# Patient Record
Sex: FEMALE | Race: WHITE | Employment: OTHER | URBAN - METROPOLITAN AREA
[De-identification: names, ages, dates, MRNs, and addresses within clinical notes are randomized per-mention and may not be internally consistent; named-entity substitution may affect disease eponyms.]

---

## 2017-01-11 ENCOUNTER — THERAPY VISIT (OUTPATIENT)
Dept: PHYSICAL THERAPY | Facility: CLINIC | Age: 57
End: 2017-01-11
Payer: COMMERCIAL

## 2017-01-11 DIAGNOSIS — Z47.89 AFTERCARE FOLLOWING SURGERY OF THE MUSCULOSKELETAL SYSTEM: ICD-10-CM

## 2017-01-11 DIAGNOSIS — M75.121 COMPLETE TEAR OF RIGHT ROTATOR CUFF: Primary | ICD-10-CM

## 2017-01-11 PROCEDURE — 97140 MANUAL THERAPY 1/> REGIONS: CPT | Mod: GP | Performed by: PHYSICAL THERAPIST

## 2017-01-11 PROCEDURE — 97110 THERAPEUTIC EXERCISES: CPT | Mod: GP | Performed by: PHYSICAL THERAPIST

## 2017-01-11 PROCEDURE — G0283 ELEC STIM OTHER THAN WOUND: HCPCS | Mod: GP | Performed by: PHYSICAL THERAPIST

## 2017-01-11 NOTE — Clinical Note
[facility] ATHLETIC [facility] PHYSICAL THERAPY  [address]  [facility] [address]  [phone]    2017    Re: Kyung Adair   :   1960  MRN:  [id number]   REFERRING PHYSICIAN:   Mychal Marx    [facility]    Date of Initial Evaluation:  16  Visits:  Rxs Used: 21  Reason for Referral:     Complete tear of right rotator cuff  Aftercare following surgery of the musculoskeletal system    PROGRESS  REPORT    Progress reporting period is from 16 to 17 (21 visits).       SUBJECTIVE  Subjective changes noted by patient:  Patient reports she felt like things were progressing very well overall; however, notes she felt increased pain after her last PT visit.  Pain over alfredito-lateral deltoid/biceps area with reaching since then.  She reports pain level as 1-2/10 at rest and up to 4/10 with reaching up.  Previously, it was intermittent and up to 1-2/10 at most.  Changes during last PT visit included adding 6oz wt with rotator cuff program and attempting wall ball circles elevated above shoulder height.  We discussed backing off exercises for a few days, icing and then gradually resuming.  She will return to see Dr Marx for follow up tomorrow.     Current pain level is:  (1-2/10 at rst 4/10 with reaching).     Previous pain level was: 5/10.   Changes in function:  Yes (See Goal flowsheet attached for changes in current functional level)  Adverse reaction to treatment or activity: None, treatment - increase soreness after last PT visit with gradual increase in resistance     OBJECTIVE  Changes noted in objective findings:   SHOULDER:                Shoulder:   PROM L PROM R AROM L AROM R MMT L MMT R   Flex 175 167 WNL WNL 5 5 with pain anterior shoulder   Abd   WNL Pain , able through full motion (was not painful prior to this week) 5 5   Full Can     5 5   Empty Can     5 5    IR 61 (Abducted 90) 28 (abducted 90) WNL WNL 5 5   ER 88 (Abducted 90) 86 (Abducted 90) WNL WNL 4 4   Ext/IR   T7 T12 with trace pain, not to midline (stiff)       Scapulothoraic Rhythm: nil scap hike with flexion, minimal with abduction    Palpation: tender to palpation anterior shoulder/deltiod and over biceps muscle belly area.     Current PT:   ROM: Pendulum, pulley flexion/scaption, 4 corner flex, ER, abd/ER, ext/IR, cross body and sleeper  Strength: (mostly isotonics) flexion, scaption, IR band, scap ret/dep, wall push up plus, standing ext, standing habd, SL ER 0-6 oz with any hands free exercises and red-green band for IR.   Proprioception: added circles with ball at wall last week about 120 flexion  Low grade post/inf joint mobilization Gr II, gentle PROM in clinic.  Ice as needed                  ASSESSMENT/PLAN  Updated problem list and treatment plan: Diagnosis 1:  S/p R rotator cuff repair, biceps tenotomy, SAD on 8/24/16.  (4.5 months or 20 wk post op)  ( Pain -  hot/cold therapy and electric stimulation  Decreased ROM/flexibility - manual therapy and therapeutic exercise  Decreased strength - therapeutic exercise and therapeutic activities  Decreased proprioception - neuro re-education and therapeutic activities  Decreased function - therapeutic activities  Impaired posture - neuro re-education  STG/LTGs have been met or progress has been made towards goals:  Yes (See Goal flow sheet completed today.)  Assessment of Progress: The patient's condition is improving overall, though she had increased symptoms that were new this past week.   Self Management Plans:  Patient has been instructed in a home treatment program.  I have re-evaluated this patient and find that the nature, scope, duration and intensity of the therapy is appropriate for the medical condition of the patient.  Kyung continues to require the following intervention to meet STG and LTG's:  PT    Recommendations:  This patient would  benefit from continued therapy.     Frequency:  1 X week, once daily  Duration:  for 3 weeks tapering to 1 X every other week over 5 weeks    Thank you for your referral.    INQUIRIES  Therapist: Dexter Martínez   [place] [facility] [place] PH[facility]ICAL [facility]ERAP[facility]  [address] [address]  Phone: [phone]  Fax: [phone]

## 2017-01-11 NOTE — Clinical Note
PT progress note.  She saw you 1/12/17 at [facility].  I faxed a note the evening of 1/11/17, but she said it wasn't available to you at her follow up on 1/12/17. Sorry that you weren't able to see it prior to her visit.   She just requested I pass it along to you at the [facility].  She seems clear on your instructions, and it sounds like we are on the same page, but feel free to let me know if you have any other specific instructions.  Thanks, John Paul

## 2017-01-11 NOTE — PROGRESS NOTES
Subjective:    HPI                    Objective:    System    Physical Exam    General     ROS    Assessment/Plan:      PROGRESS  REPORT    Progress reporting period is from 9/1/16 to 1/11/17 (21 visits).       SUBJECTIVE  Subjective changes noted by patient:  Patient reports she felt like things were progressing very well overall; however, notes she felt increased pain after her last PT visit.  Pain over alfredito-lateral deltoid/biceps area with reaching since then.  She reports pain level as 1-2/10 at rest and up to 4/10 with reaching up.  Previously, it was intermittent and up to 1-2/10 at most.  Changes during last PT visit included adding 6oz wt with rotator cuff program and attempting wall ball circles elevated above shoulder height.  We discussed backing off exercises for a few days, icing and then gradually resuming.  She will return to see Dr Marx for follow up tomorrow.     Current pain level is:  (1-2/10 at rst 4/10 with reaching).     Previous pain level was: 5/10.   Changes in function:  Yes (See Goal flowsheet attached for changes in current functional level)  Adverse reaction to treatment or activity: None, treatment - increase soreness after last PT visit with gradual increase in resistance     OBJECTIVE  Changes noted in objective findings:   SHOULDER:    Shoulder:   PROM L PROM R AROM L AROM R MMT L MMT R   Flex 175 167 WNL WNL 5 5 with pain anterior shoulder   Abd   WNL Pain , able through full motion (was not painful prior to this week) 5 5   Full Can     5 5   Empty Can     5 5   IR 61 (Abducted 90) 28 (abducted 90) WNL WNL 5 5   ER 88 (Abducted 90) 86 (Abducted 90) WNL WNL 4 4   Ext/IR   T7 T12 with trace pain, not to midline (stiff)       Scapulothoraic Rhythm: nil scap hike with flexion, minimal with abduction    Palpation: tender to palpation anterior shoulder/deltiod and over biceps muscle belly area.     Current PT:   ROM: Pendulum, pulley flexion/scaption, 4 corner flex, ER, abd/ER,  ext/IR, cross body and sleeper  Strength: (mostly isotonics) flexion, scaption, IR band, scap ret/dep, wall push up plus, standing ext, standing habd, SL ER 0-6 oz with any hands free exercises and red-green band for IR.   Proprioception: added circles with ball at wall last week about 120 flexion  Low grade post/inf joint mobilization Gr II, gentle PROM in clinic.  Ice as needed      ASSESSMENT/PLAN  Updated problem list and treatment plan: Diagnosis 1:  S/p R rotator cuff repair, biceps tenotomy, SAD on 8/24/16.  (4.5 months or 20 wk post op)  ( Pain -  hot/cold therapy and electric stimulation  Decreased ROM/flexibility - manual therapy and therapeutic exercise  Decreased strength - therapeutic exercise and therapeutic activities  Decreased proprioception - neuro re-education and therapeutic activities  Decreased function - therapeutic activities  Impaired posture - neuro re-education  STG/LTGs have been met or progress has been made towards goals:  Yes (See Goal flow sheet completed today.)  Assessment of Progress: The patient's condition is improving overall, though she had increased symptoms that were new this past week.   Self Management Plans:  Patient has been instructed in a home treatment program.  I have re-evaluated this patient and find that the nature, scope, duration and intensity of the therapy is appropriate for the medical condition of the patient.  Kyung continues to require the following intervention to meet STG and LTG's:  PT    Recommendations:  This patient would benefit from continued therapy.     Frequency:  1 X week, once daily  Duration:  for 3 weeks tapering to 1 X every other week over 5 weeks        Please refer to the daily flowsheet for treatment today, total treatment time and time spent performing 1:1 timed codes.

## 2017-01-18 ENCOUNTER — THERAPY VISIT (OUTPATIENT)
Dept: PHYSICAL THERAPY | Facility: CLINIC | Age: 57
End: 2017-01-18
Payer: COMMERCIAL

## 2017-01-18 DIAGNOSIS — M75.121 COMPLETE TEAR OF RIGHT ROTATOR CUFF: Primary | ICD-10-CM

## 2017-01-18 DIAGNOSIS — Z47.89 AFTERCARE FOLLOWING SURGERY OF THE MUSCULOSKELETAL SYSTEM: ICD-10-CM

## 2017-01-18 PROCEDURE — 97112 NEUROMUSCULAR REEDUCATION: CPT | Mod: GP | Performed by: PHYSICAL THERAPIST

## 2017-01-18 PROCEDURE — 97110 THERAPEUTIC EXERCISES: CPT | Mod: GP | Performed by: PHYSICAL THERAPIST

## 2017-01-18 PROCEDURE — G0283 ELEC STIM OTHER THAN WOUND: HCPCS | Mod: GP | Performed by: PHYSICAL THERAPIST

## 2017-01-25 ENCOUNTER — THERAPY VISIT (OUTPATIENT)
Dept: PHYSICAL THERAPY | Facility: CLINIC | Age: 57
End: 2017-01-25
Payer: COMMERCIAL

## 2017-01-25 DIAGNOSIS — Z47.89 AFTERCARE FOLLOWING SURGERY OF THE MUSCULOSKELETAL SYSTEM: ICD-10-CM

## 2017-01-25 DIAGNOSIS — M75.121 COMPLETE TEAR OF RIGHT ROTATOR CUFF: Primary | ICD-10-CM

## 2017-01-25 PROCEDURE — 97110 THERAPEUTIC EXERCISES: CPT | Mod: GP | Performed by: PHYSICAL THERAPIST

## 2017-01-25 PROCEDURE — G0283 ELEC STIM OTHER THAN WOUND: HCPCS | Mod: GP | Performed by: PHYSICAL THERAPIST

## 2017-01-25 PROCEDURE — 97112 NEUROMUSCULAR REEDUCATION: CPT | Mod: GP | Performed by: PHYSICAL THERAPIST

## 2017-02-01 ENCOUNTER — THERAPY VISIT (OUTPATIENT)
Dept: PHYSICAL THERAPY | Facility: CLINIC | Age: 57
End: 2017-02-01
Payer: COMMERCIAL

## 2017-02-01 DIAGNOSIS — Z47.89 AFTERCARE FOLLOWING SURGERY OF THE MUSCULOSKELETAL SYSTEM: ICD-10-CM

## 2017-02-01 DIAGNOSIS — M75.121 COMPLETE TEAR OF RIGHT ROTATOR CUFF: Primary | ICD-10-CM

## 2017-02-01 PROCEDURE — 97112 NEUROMUSCULAR REEDUCATION: CPT | Mod: GP | Performed by: PHYSICAL THERAPIST

## 2017-02-01 PROCEDURE — G0283 ELEC STIM OTHER THAN WOUND: HCPCS | Mod: GP | Performed by: PHYSICAL THERAPIST

## 2017-02-01 PROCEDURE — 97110 THERAPEUTIC EXERCISES: CPT | Mod: GP | Performed by: PHYSICAL THERAPIST

## 2017-02-15 ENCOUNTER — THERAPY VISIT (OUTPATIENT)
Dept: PHYSICAL THERAPY | Facility: CLINIC | Age: 57
End: 2017-02-15
Payer: COMMERCIAL

## 2017-02-15 DIAGNOSIS — Z47.89 AFTERCARE FOLLOWING SURGERY OF THE MUSCULOSKELETAL SYSTEM: ICD-10-CM

## 2017-02-15 DIAGNOSIS — M75.121 COMPLETE TEAR OF RIGHT ROTATOR CUFF: Primary | ICD-10-CM

## 2017-02-15 PROCEDURE — 97110 THERAPEUTIC EXERCISES: CPT | Mod: GP | Performed by: PHYSICAL THERAPIST

## 2017-02-15 PROCEDURE — G0283 ELEC STIM OTHER THAN WOUND: HCPCS | Mod: GP | Performed by: PHYSICAL THERAPIST

## 2017-02-15 PROCEDURE — 97112 NEUROMUSCULAR REEDUCATION: CPT | Mod: GP | Performed by: PHYSICAL THERAPIST

## 2017-03-08 ENCOUNTER — THERAPY VISIT (OUTPATIENT)
Dept: PHYSICAL THERAPY | Facility: CLINIC | Age: 57
End: 2017-03-08
Payer: COMMERCIAL

## 2017-03-08 DIAGNOSIS — M75.121 COMPLETE TEAR OF RIGHT ROTATOR CUFF: ICD-10-CM

## 2017-03-08 DIAGNOSIS — Z47.89 AFTERCARE FOLLOWING SURGERY OF THE MUSCULOSKELETAL SYSTEM: ICD-10-CM

## 2017-03-08 PROCEDURE — 97110 THERAPEUTIC EXERCISES: CPT | Mod: GP | Performed by: PHYSICAL THERAPIST

## 2017-03-08 PROCEDURE — 97140 MANUAL THERAPY 1/> REGIONS: CPT | Mod: GP | Performed by: PHYSICAL THERAPIST

## 2017-03-08 PROCEDURE — 97112 NEUROMUSCULAR REEDUCATION: CPT | Mod: GP | Performed by: PHYSICAL THERAPIST

## 2017-03-29 ENCOUNTER — THERAPY VISIT (OUTPATIENT)
Dept: PHYSICAL THERAPY | Facility: CLINIC | Age: 57
End: 2017-03-29
Payer: COMMERCIAL

## 2017-03-29 DIAGNOSIS — M75.121 COMPLETE TEAR OF RIGHT ROTATOR CUFF: ICD-10-CM

## 2017-03-29 DIAGNOSIS — Z47.89 AFTERCARE FOLLOWING SURGERY OF THE MUSCULOSKELETAL SYSTEM: ICD-10-CM

## 2017-03-29 PROCEDURE — 97110 THERAPEUTIC EXERCISES: CPT | Mod: GP | Performed by: PHYSICAL THERAPIST

## 2017-03-29 PROCEDURE — 97112 NEUROMUSCULAR REEDUCATION: CPT | Mod: GP | Performed by: PHYSICAL THERAPIST

## 2017-04-21 ENCOUNTER — TELEPHONE (OUTPATIENT)
Dept: OTHER | Facility: CLINIC | Age: 57
End: 2017-04-21

## 2017-04-21 NOTE — TELEPHONE ENCOUNTER
4/21/2017    Call Regarding Onboarding [employer]    Attempt 1    Message on voicemail     Comments: No Dep      Outreach   cnt

## 2017-04-24 ENCOUNTER — THERAPY VISIT (OUTPATIENT)
Dept: PHYSICAL THERAPY | Facility: CLINIC | Age: 57
End: 2017-04-24
Payer: COMMERCIAL

## 2017-04-24 DIAGNOSIS — Z47.89 AFTERCARE FOLLOWING SURGERY OF THE MUSCULOSKELETAL SYSTEM: ICD-10-CM

## 2017-04-24 DIAGNOSIS — M75.121 COMPLETE TEAR OF RIGHT ROTATOR CUFF: ICD-10-CM

## 2017-04-24 PROCEDURE — 97110 THERAPEUTIC EXERCISES: CPT | Mod: GP | Performed by: PHYSICAL THERAPIST

## 2017-04-24 PROCEDURE — 97112 NEUROMUSCULAR REEDUCATION: CPT | Mod: GP | Performed by: PHYSICAL THERAPIST

## 2017-05-04 NOTE — TELEPHONE ENCOUNTER
Call Regarding Onboarding [employer]    Attempt 2    Message on voicemail     Comments:       Outreach   Elli Marie

## 2017-05-10 NOTE — TELEPHONE ENCOUNTER
5/10/2017    Call Regarding Onboarding [employer]    Attempt 3    Message on voicemail     Comments:       Outreach   KV

## 2017-05-23 ENCOUNTER — THERAPY VISIT (OUTPATIENT)
Dept: PHYSICAL THERAPY | Facility: CLINIC | Age: 57
End: 2017-05-23
Payer: COMMERCIAL

## 2017-05-23 DIAGNOSIS — Z47.89 AFTERCARE FOLLOWING SURGERY OF THE MUSCULOSKELETAL SYSTEM: ICD-10-CM

## 2017-05-23 DIAGNOSIS — M75.121 COMPLETE TEAR OF RIGHT ROTATOR CUFF: ICD-10-CM

## 2017-05-23 PROCEDURE — 97112 NEUROMUSCULAR REEDUCATION: CPT | Mod: GP | Performed by: PHYSICAL THERAPIST

## 2017-05-23 PROCEDURE — 97110 THERAPEUTIC EXERCISES: CPT | Mod: GP | Performed by: PHYSICAL THERAPIST

## 2017-05-23 NOTE — LETTER
[facility] ATHLETIC [facility] PHYSICAL THERAPY  [address]  [facility] [address]  [phone]    May 23, 2017    Re: Kyung Adair   :   1960  MRN:  [id number]   REFERRING PHYSICIAN:   Mychal Marx    [facility]    Date of Initial Evaluation:  17  Visits:  Rxs Used: 29  Reason for Referral:     Complete tear of right rotator cuff  Aftercare following surgery of the musculoskeletal system      DISCHARGE REPORT    Progress reporting period is from 16 to 17 (29 visits).       SUBJECTIVE  Subjective changes noted by patient:  Patient reports that she is doing very well overall.  Most of the time she is without pain, rarely, she will have intermittent discomfort up to 1/10 now.  She notes that daily activities such as showering, dressing and doing her hair are giong well.  Work is mostly good with regards to her shoulder.  She has returned to gardening and intermittently has mild soreness with that, but it resolves with rest and ice.  She has been consistent with her HEP all along and understands that well.  She feels ready to be discharged from PT at this point and continue with her HEP independently.  She will follow up with her surgeon, Dr Marx, in August.     Current pain level is:  (0-1/10).     Previous pain level was: 5/10.   Changes in function:  Yes (See Goal flowsheet attached for changes in current functional level)  Adverse reaction to treatment or activity: None    OBJECTIVE  Changes noted in objective findings:  Yes,                 SHOULDER:    Shoulder:   PROM L PROM R AROM L AROM R MMT L MMT R   Flex 175 175 WNL WNL 5 5   Abd   WNL WNL 5 5   Full Can     5 5   Empty Can     5 5   IR 57 (Abducted to 90) 50 (abducted to 90) WNL WNL 5 5   ER 90 (abducted to 90) 90 (abducted to 90) WNL WNL 5 5   Ext/IR   T7 T9 (trace discomfort)       Scapulothoraic Rhythm: elevation is  with good scapular control, without upper trap overactivity or hike during flexion, abduction.     SPADI score: 5.38%    She has rotator cuff strengthening program, scapular strength program and stretching program in place she can refer to.  We discussed a 4 exercise cuff strength maintenance program to try to keep up 1-2x/week just for maintenance/prevention.  She understands this as well .    ASSESSMENT/PLAN  Updated problem list and treatment plan: Diagnosis 1:  s/p R rotator cuff repair, biceps tenotomy, SAD on 8/24/16    Will continue HEP to address any mild/intermittent pain, stiffness and to keep her strength up.   STG/LTGs have been met or progress has been made towards goals:  Yes (See Goal flow sheet completed today.)  Assessment of Progress: The patient's condition is improving.  Self Management Plans:  Patient has been instructed in a home treatment program.  I have re-evaluated this patient and find that the nature, scope, duration and intensity of the therapy is appropriate for the medical condition of the patient.  Kyung continues to require the following intervention to meet STG and LTG's:  PT intervention is no longer required to meet STG/LTG.    Recommendations:  This patient is ready to be discharged from therapy and continue their home treatment program.                  Thank you for your referral.    INQUIRIES  Therapist: Dexter Martínez   [place] [facility] [place] PHYSICAL THERAPY  [address] [address]  Phone: [phone]  Fax: [phone]

## 2017-05-23 NOTE — MR AVS SNAPSHOT
After Visit Summary   5/23/2017    Kyung Adair    MRN: [id number]           Patient Information     Date Of Birth          1960        Visit Information        Provider Department      5/23/2017 4:20 PM Dexter Martínez PT [place] [facility]        Today's Diagnoses     Complete tear of right rotator cuff        Aftercare following surgery of the musculoskeletal system           Follow-ups after your visit        Your next 10 appointments already scheduled     Aug 04, 2017  8:30 AM CDT   (Arrive by 8:15 AM)   Return Visit with Mychal Marx MD   [facility] ([facility])    [address] [place] [address]  [address] [employer] [address] [phone]              Who to contact     If you have questions or need follow up information about today's clinic visit or your schedule please contact [place] [facility]AP[facility] directly at [phone].  Normal or non-critical lab and imaging results will be communicated to you by [employer], letter or phone within 4 business days after the clinic has received the results. If you do not hear from us within 7 days, please contact the clinic through [employer] or phone. If you have a critical or abnormal lab result, we will notify you by phone as soon as possible.  Submit refill requests through [employer] or call your pharmacy and they will forward the refill request to us. Please allow 3 business days for your refill to be completed.          Additional Information About Your Visit        [employer] Information     [employer] gives you secure access to your electronic health record. If you see a primary care provider, you can also send messages to your care team and make appointments. If you have questions, please call your primary care clinic.  If you do not have a primary care provider, please call [phone] and they will assist you.         Care EveryWhere ID     This is your Care EveryWhere ID. This could be used by other organizations to access your [facility] medical records  [id number]         Blood Pressure from Last 3 Encounters:   08/25/16 130/66   08/10/16 (!) 158/97   08/14/13 (!) 147/100    Weight from Last 3 Encounters:   08/24/16 132.5 kg (292 lb 1.8 oz)   08/10/16 133.8 kg (295 lb)   07/01/16 131.5 kg (290 lb)              We Performed the Following     HORTENCIA PROGRESS NOTES REPORT     NEUROMUSCULAR RE-EDUCATION     THERAPEUTIC EXERCISES        Primary Care Provider Office Phone # Fax #    [facility] [phone]       [facility] [address] [address]        Thank you!     Thank you for [facility] PHYSICAL THERAPY  for your care. Our goal is always to provide you with excellent care. Hearing back from our patients is one way we can continue to improve our services. Please take a few minutes to complete the written survey that you may receive in the mail after your visit with us. Thank you!             Your Updated Medication List - Protect others around you: Learn how to safely use, store and throw away your medicines at www.disposemymeds.org.          This list is accurate as of: 5/23/17  7:33 PM.  Always use your most recent med list.                   Brand Name Dispense Instructions for use    albuterol 90 MCG/ACT inhaler      Inhale 2 puffs into the lungs every 6 hours as needed.       CALCIUM 500 + D PO      Take 1 tablet by mouth 2 times daily.       COUMADIN 5 MG tablet   Generic drug:  warfarin     30 tablet    Take 1.5 tablets (7.5 mg) by mouth daily       enoxaparin 40 MG/0.4ML injection    LOVENOX    4 mL    Inject 0.4 mLs (40 mg) Subcutaneous every 24 hours       GLUCOPHAGE 850 MG tablet   Generic drug:  metFORMIN      Take 850 mg by mouth 2 times daily (with meals).       heparin sodium PF 5000 UNIT/0.5ML injection     2 mL     Inject 0.5 mLs (5,000 Units) Subcutaneous every 8 hours       oxyCODONE 5 MG IR tablet    ROXICODONE    80 tablet    Take 1-2 tablets (5-10 mg) by mouth every 4 hours as needed for moderate to severe pain       QVAR 80 MCG/ACT Inhaler   Generic drug:  beclomethasone      Inhale 1 puff into the lungs as needed       senna-docusate 8.6-50 MG per tablet    SENOKOT-S;PERICOLACE    30 tablet    Take 1-2 tablets by mouth 2 times daily       SYNTHROID 137 MCG tablet   Generic drug:  levothyroxine      Take 137 mcg by mouth daily       valACYclovir 500 MG tablet    VALTREX

## 2017-05-24 NOTE — PROGRESS NOTES
Subjective:    HPI                    Objective:    System    Physical Exam    General     ROS    Assessment/Plan:      DISCHARGE REPORT    Progress reporting period is from 9/1/16 to 5/23/17 (29 visits).       SUBJECTIVE  Subjective changes noted by patient:  Patient reports that she is doing very well overall.  Most of the time she is without pain, rarely, she will have intermittent discomfort up to 1/10 now.  She notes that daily activities such as showering, dressing and doing her hair are giong well.  Work is mostly good with regards to her shoulder.  She has returned to gardening and intermittently has mild soreness with that, but it resolves with rest and ice.  She has been consistent with her HEP all along and understands that well.  She feels ready to be discharged from PT at this point and continue with her HEP independently.  She will follow up with her surgeon, Dr Marx, in August.     Current pain level is:  (0-1/10).     Previous pain level was: 5/10.   Changes in function:  Yes (See Goal flowsheet attached for changes in current functional level)  Adverse reaction to treatment or activity: None    OBJECTIVE  Changes noted in objective findings:  Yes,   SHOULDER:    Shoulder:   PROM L PROM R AROM L AROM R MMT L MMT R   Flex 175 175 WNL WNL 5 5   Abd   WNL WNL 5 5   Full Can     5 5   Empty Can     5 5   IR 57 (Abducted to 90) 50 (abducted to 90) WNL WNL 5 5   ER 90 (abducted to 90) 90 (abducted to 90) WNL WNL 5 5   Ext/IR   T7 T9 (trace discomfort)       Scapulothoraic Rhythm: elevation is with good scapular control, without upper trap overactivity or hike during flexion, abduction.     SPADI score: 5.38%    She has rotator cuff strengthening program, scapular strength program and stretching program in place she can refer to.  We discussed a 4 exercise cuff strength maintenance program to try to keep up 1-2x/week just for maintenance/prevention.  She understands this as well  .    ASSESSMENT/PLAN  Updated problem list and treatment plan: Diagnosis 1:  s/p R rotator cuff repair, biceps tenotomy, SAD on 8/24/16    Will continue HEP to address any mild/intermittent pain, stiffness and to keep her strength up.   STG/LTGs have been met or progress has been made towards goals:  Yes (See Goal flow sheet completed today.)  Assessment of Progress: The patient's condition is improving.  Self Management Plans:  Patient has been instructed in a home treatment program.  I have re-evaluated this patient and find that the nature, scope, duration and intensity of the therapy is appropriate for the medical condition of the patient.  Kyung continues to require the following intervention to meet STG and LTG's:  PT intervention is no longer required to meet STG/LTG.    Recommendations:  This patient is ready to be discharged from therapy and continue their home treatment program.    Please refer to the daily flowsheet for treatment today, total treatment time and time spent performing 1:1 timed codes.

## 2017-06-03 ENCOUNTER — HEALTH MAINTENANCE LETTER (OUTPATIENT)
Age: 57
End: 2017-06-03

## 2017-08-04 ENCOUNTER — OFFICE VISIT (OUTPATIENT)
Dept: ORTHOPEDICS | Facility: CLINIC | Age: 57
End: 2017-08-04

## 2017-08-04 DIAGNOSIS — Z98.890 S/P SHOULDER SURGERY: Primary | ICD-10-CM

## 2017-08-04 NOTE — MR AVS SNAPSHOT
After Visit Summary   8/4/2017    Kyung Adair    MRN: [id number]           Patient Information     Date Of Birth          1960        Visit Information        Provider Department      8/4/2017 8:30 AM Mychal Marx MD [facility]        Today's Diagnoses     S/P shoulder surgery    -  1       Follow-ups after your visit        Who to contact     Please call your clinic at [phone] to:    Ask questions about your health    Make or cancel appointments    Discuss your medicines    Learn about your test results    Speak to your doctor   If you have compliments or concerns about an experience at your clinic, or if you wish to file a complaint, please contact [facility] Patient Relations at [phone] or email us at [email or web address]         Additional Information About Your Visit        MyChart Information     [employer] gives you secure access to your electronic health record. If you see a primary care provider, you can also send messages to your care team and make appointments. If you have questions, please call your primary care clinic.  If you do not have a primary care provider, please call [phone] and they will assist you.      "[employer] is an electronic gateway that provides easy, online access to your medical records. With "[employer] you can request a clinic appointment, read your test results, renew a prescription or communicate with your care team.     To access your existing account, please contact your [facility] or call [phone] for assistance.        Care EveryWhere ID     This is your Care EveryWhere ID. This could be used by other organizations to access your [place] medical records  [id number]         Blood Pressure from Last 3 Encounters:   08/25/16 130/66   08/10/16 (!) 158/97   08/14/13 (!) 147/100    Weight from Last 3 Encounters:   08/24/16 292 lb 1.8 oz (132.5 kg)    08/10/16 295 lb (133.8 kg)   07/01/16 290 lb (131.5 kg)              Today, you had the following     No orders found for display       Primary Care Provider Office Phone # Fax #    [facility] [phone]       [facility] [address] [address]        Equal Access to Services     [facility]: Hadii aad ku hadasho Soomaali, waaxda luqadaha, qaybta kaalmada adeegyada, waxay idiin hayaan adeeg kharash lanolberton . So [facility] [phone].    ATENCIÓN: Si habla espiban, tiene a sotomayor disposición servicios gratuitos de asistencia lingüística. Llame al [phone].    We comply with applicable federal civil rights laws and [place] laws. We do not discriminate on the basis of race, color, national origin, age, disability sex, sexual orientation or gender identity.            Thank you!     Thank you for choosing [facility]  for your care. Our goal is always to provide you with excellent care. Hearing back from our patients is one way we can continue to improve our services. Please take a few minutes to complete the written survey that you may receive in the mail after your visit with us. Thank you!             Your Updated Medication List - Protect others around you: Learn how to safely use, store and throw away your medicines at www.disposemymeds.org.          This list is accurate as of: 8/4/17 11:59 PM.  Always use your most recent med list.                   Brand Name Dispense Instructions for use Diagnosis    albuterol 90 MCG/ACT inhaler      Inhale 2 puffs into the lungs every 6 hours as needed.        CALCIUM 500 + D PO      Take 1 tablet by mouth 2 times daily.        COUMADIN 5 MG tablet   Generic drug:  warfarin     30 tablet    Take 1.5 tablets (7.5 mg) by mouth daily    S/P rotator cuff repair       enoxaparin 40 MG/0.4ML injection    LOVENOX    4 mL    Inject 0.4 mLs (40 mg) Subcutaneous every 24 hours    Chronic anticoagulation        GLUCOPHAGE 850 MG tablet   Generic drug:  metFORMIN      Take 850 mg by mouth 2 times daily (with meals).        heparin sodium PF 5000 UNIT/0.5ML injection     2 mL    Inject 0.5 mLs (5,000 Units) Subcutaneous every 8 hours    S/P rotator cuff repair       oxyCODONE 5 MG IR tablet    ROXICODONE    80 tablet    Take 1-2 tablets (5-10 mg) by mouth every 4 hours as needed for moderate to severe pain    S/P rotator cuff repair       QVAR 80 MCG/ACT Inhaler   Generic drug:  beclomethasone      Inhale 1 puff into the lungs as needed        senna-docusate 8.6-50 MG per tablet    SENOKOT-S;PERICOLACE    30 tablet    Take 1-2 tablets by mouth 2 times daily    S/P rotator cuff repair       SYNTHROID 137 MCG tablet   Generic drug:  levothyroxine      Take 137 mcg by mouth daily        valACYclovir 500 MG tablet    VALTREX

## 2017-08-04 NOTE — LETTER
Return to Work  2017     Seen today: yes    Patient:  Kyung Adair  :   1960  MRN:     [id number]  Physician: PETRA MARX    Kyung Adair may return to work with the following restrictions regarding bilateral shoulders:    -No lifting over 10lbs  -No repetitive overhead lifting    She will follow up with me as needed.         Electronically signed by Petra Marx MD & Courtney Kennedy ATC

## 2017-08-04 NOTE — LETTER
2017      RE: Dona August  [address]  [facility] [address]       CHIEF COMPLAINT:  Postoperative visit, bilateral shoulders.      HISTORY OF PRESENT ILLNESS:  Ms. August is a 56-year-old female who is now 6 years status post left arthroscopic rotator cuff repair and 1 year status post right arthroscopic rotator cuff repair.  She is doing well.  She would rank her left shoulder as a 100 and her right shoulder as a 95.  She has no pain.  She has no functional limitations.  She is very happy with her surgical results at this time.      PHYSICAL EXAMINATION:  56-year-old female, alert and oriented, in no apparent distress.  Range of motion reveals 160 degrees of forward flexion, 45 degrees of external rotation, 90 degrees of external rotation in abduction, 40 degrees of internal rotation on the left and 20 on the right.  Her rotator cuff strength is 5/5.  She has no AC joint tenderness bilaterally.  No biceps deformity bilaterally.      IMPRESSION:  56-year-old female 6 years status post left arthroscopic rotator cuff repair and biceps tenotomy and 1 year status post right arthroscopic rotator cuff repair and biceps tenotomy, doing extremely well.  Dona and I had a long conversation today about caring for her shoulders.  I think it would be a good idea for her to have a permanent work restriction that limited her lifting to 10 pounds.  In addition, she should avoid repetitive overhead work.      PLAN:   1.  She was given a letter for work restrictions.   2.  She will follow up with me in 1 year for a routine recheck.        I spent 15 minutes with this patient, 10 minutes dedicated to counseling, education and development of a treatment plan.         PETRA COOK MD             D: 2017 18:46   T: 2017 20:50   MT: LITA      Name:     DONA AUGUST   MRN:      [id number]        Account:      [id number]   :      1960           Service Date: 2017      Document:  [id number]

## 2017-08-07 NOTE — PROGRESS NOTES
CHIEF COMPLAINT:  Postoperative visit, bilateral shoulders.      HISTORY OF PRESENT ILLNESS:  Ms. August is a 56-year-old female who is now 6 years status post left arthroscopic rotator cuff repair and 1 year status post right arthroscopic rotator cuff repair.  She is doing well.  She would rank her left shoulder as a 100 and her right shoulder as a 95.  She has no pain.  She has no functional limitations.  She is very happy with her surgical results at this time.      PHYSICAL EXAMINATION:  56-year-old female, alert and oriented, in no apparent distress.  Range of motion reveals 160 degrees of forward flexion, 45 degrees of external rotation, 90 degrees of external rotation in abduction, 40 degrees of internal rotation on the left and 20 on the right.  Her rotator cuff strength is 5/5.  She has no AC joint tenderness bilaterally.  No biceps deformity bilaterally.      IMPRESSION:  56-year-old female 6 years status post left arthroscopic rotator cuff repair and biceps tenotomy and 1 year status post right arthroscopic rotator cuff repair and biceps tenotomy, doing extremely well.  Dona and I had a long conversation today about caring for her shoulders.  I think it would be a good idea for her to have a permanent work restriction that limited her lifting to 10 pounds.  In addition, she should avoid repetitive overhead work.      PLAN:   1.  She was given a letter for work restrictions.   2.  She will follow up with me in 1 year for a routine recheck.        I spent 15 minutes with this patient, 10 minutes dedicated to counseling, education and development of a treatment plan.         PETRA COOK MD             D: 2017 18:46   T: 2017 20:50   MT: LITA      Name:     DONA AUGUST   MRN:      [id number]        Account:      [id number]   :      1960           Service Date: 2017      Document: [id number]

## 2018-06-26 ENCOUNTER — THERAPY VISIT (OUTPATIENT)
Dept: PHYSICAL THERAPY | Facility: CLINIC | Age: 58
End: 2018-06-26
Payer: COMMERCIAL

## 2018-06-26 DIAGNOSIS — M17.0 PRIMARY OSTEOARTHRITIS OF BOTH KNEES: Primary | ICD-10-CM

## 2018-06-26 PROCEDURE — 97161 PT EVAL LOW COMPLEX 20 MIN: CPT | Mod: GP | Performed by: PHYSICAL THERAPIST

## 2018-06-26 PROCEDURE — 97110 THERAPEUTIC EXERCISES: CPT | Mod: GP | Performed by: PHYSICAL THERAPIST

## 2018-06-26 ASSESSMENT — ACTIVITIES OF DAILY LIVING (ADL)
SQUAT: I AM UNABLE TO DO THE ACTIVITY
STAND: ACTIVITY IS VERY DIFFICULT
KNEEL ON THE FRONT OF YOUR KNEE: I AM UNABLE TO DO THE ACTIVITY
PAIN: THE SYMPTOM AFFECTS MY ACTIVITY SEVERELY
LIMPING: THE SYMPTOM AFFECTS MY ACTIVITY SEVERELY
KNEE_ACTIVITY_OF_DAILY_LIVING_SUM: 17
SWELLING: THE SYMPTOM AFFECTS MY ACTIVITY SLIGHTLY
GO UP STAIRS: ACTIVITY IS VERY DIFFICULT
RAW_SCORE: 17
HOW_WOULD_YOU_RATE_THE_CURRENT_FUNCTION_OF_YOUR_KNEE_DURING_YOUR_USUAL_DAILY_ACTIVITIES_ON_A_SCALE_FROM_0_TO_100_WITH_100_BEING_YOUR_LEVEL_OF_KNEE_FUNCTION_PRIOR_TO_YOUR_INJURY_AND_0_BEING_THE_INABILITY_TO_PERFORM_ANY_OF_YOUR_USUAL_DAILY_ACTIVITIES?: 50
WEAKNESS: THE SYMPTOM AFFECTS MY ACTIVITY SEVERELY
SIT WITH YOUR KNEE BENT: ACTIVITY IS VERY DIFFICULT
STIFFNESS: THE SYMPTOM AFFECTS MY ACTIVITY MODERATELY
AS_A_RESULT_OF_YOUR_KNEE_INJURY,_HOW_WOULD_YOU_RATE_YOUR_CURRENT_LEVEL_OF_DAILY_ACTIVITY?: ABNORMAL
KNEE_ACTIVITY_OF_DAILY_LIVING_SCORE: 24.29
HOW_WOULD_YOU_RATE_THE_OVERALL_FUNCTION_OF_YOUR_KNEE_DURING_YOUR_USUAL_DAILY_ACTIVITIES?: ABNORMAL
GO DOWN STAIRS: ACTIVITY IS VERY DIFFICULT
RISE FROM A CHAIR: ACTIVITY IS VERY DIFFICULT
WALK: ACTIVITY IS VERY DIFFICULT
GIVING WAY, BUCKLING OR SHIFTING OF KNEE: THE SYMPTOM AFFECTS MY ACTIVITY SLIGHTLY

## 2018-06-26 NOTE — PROGRESS NOTES
[facility] Initial Evaluation  Subjective:  HPI Comments: MD orders include bilateral knees. Patient stating she is seeking treatment for her left knee today.    Patient is a 57 year old female presenting with rehab left knee hpi.   Kyung Adair is a 57 year old female with a bilateral knees (L>R) condition.  Condition occurred with:  Insidious onset.    This is a chronic and recurrent condition  Patient saw MD on 6/4/18 for L knee pain that began ~4 months ago. Had been seeing chiroprator for 6-8 months prior .    Patient reports pain:  Anterior and in the joint.    Pain is described as stabbing, shooting, sharp, cramping, burning and aching and is intermittent and reported as 6/10 and 10/10 (at its worst).  Associated symptoms:  Buckling/giving out, loss of motion/stiffness and loss of strength. Pain is the same all the time (activity dependent).  Symptoms are exacerbated by descending stairs, ascending stairs, bending/squatting, kneeling, standing, walking and transfers (sitting with knee bent) and relieved by NSAID's.  Since onset symptoms are gradually worsening.  Special tests:  X-ray.  Previous treatment includes physical therapy (2014 for knees).  There was significant improvement following previous treatment.  General health as reported by patient is good.  Pertinent medical history includes:  Asthma, cancer, overweight, thyroid problems, menopausal and sleep disorder/apnea.  Medical allergies: yes.  Other surgeries include:  Orthopedic surgery and cancer surgery (bilateral RCR, most recent 8/2016, s/p hysterectomy).  Current medications:  Heparin/coumadin (Metformin).  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  Primary job tasks include:  Repetitive tasks and prolonged sitting (computer work).    Barriers: Basement stairs for laundry.    Red flags:  None as reported by the patient.                         Objective:  Standing Alignment:              Knee:  Genu recuvatum L, genu recurvatum R, genu valgus L and genu valgus R      Gait:    Gait Type:  Antalgic   Assistive Devices:  None  Deviations:  Ankle:  Heel strike decr L and push off decr LGeneral Deviations:  Stance time decr, stride length decr, chanelle decr, base of support incr and toe out L  Non-Weight Bearing:        Knee:  Patellar lateral tilt L and patellar lateral tilt R    Flexibility/Screens:       Lower Extremity:  Decreased left lower extremity flexibility:Gastroc    Decreased right lower extremity flexibility:  Gastroc                                                 Hip Evaluation    Hip Strength:    Flexion:   Left: 4-/5   +/-  Pain:  Right: 4/5   -  Pain:                      Abduction:  Left: 3+/5    +/-   Pain:Right: 4-/5   -   Pain:                           Knee Evaluation:  ROM:    AROM    Hyperextension:  Left:  5    Right: 5  Extension:  Left: 0    Right:  0  Flexion: Left: 110    Right: 125    Pain: limiting left knee flexion    Strength:     Extension:  Left: 3+/5    Pain:+      Right: 4+/5    Pain:-  Flexion:  Left: 3/5    Pain:+      Right: 4+/5    Pain:-    Quad Set Left:  Poor    Pain: +   Quad Set Right:  Good    Pain: -      Palpation:    Left knee tenderness present at:  Medial Joint Line; Lateral Joint Line; Patellar Medial and Patellar Lateral    Edema:  Normal    Mobility Testing:        Patellofemoral Lateral:  Left: hypomobile          Functional Testing:            Proprioception:   Stork Balance Test:  Left:  Unable  Right:  Unable  % of Uninvolved:           General     ROS    Assessment/Plan:    Patient is a 57 year old female with L>R knee complaints.    Patient has the following significant findings with corresponding treatment plan.                Diagnosis 1:  Knee OA/PF pain  Pain -  hot/cold therapy, manual therapy, self management, education, directional preference exercise and home program  Decreased  ROM/flexibility - manual therapy, therapeutic exercise, therapeutic activity and home program  Decreased joint mobility - manual therapy, therapeutic exercise, therapeutic activity and home program  Decreased strength - therapeutic exercise, therapeutic activities and home program  Decreased proprioception - neuro re-education, gait training, therapeutic activities and home program  Impaired gait - gait training, assistive devices and home program  Decreased function - therapeutic activities and home program    Therapy Evaluation Codes:   1) History comprised of:   Personal factors that impact the plan of care:      Living environment and Profession.    Comorbidity factors that impact the plan of care are:      None.     Medications impacting care: None.  2) Examination of Body Systems comprised of:   Body structures and functions that impact the plan of care:      Knee.   Activity limitations that impact the plan of care are:      Sitting, Stairs, Standing, Walking and Working.  3) Clinical presentation characteristics are:   Stable/Uncomplicated.  4) Decision-Making    Low complexity using standardized patient assessment instrument and/or measureable assessment of functional outcome.  Cumulative Therapy Evaluation is: Low complexity.    Previous and current functional limitations:  (See Goal Flow Sheet for this information)    Short term and Long term goals: (See Goal Flow Sheet for this information)     Communication ability:  Patient appears to be able to clearly communicate and understand verbal and written communication and follow directions correctly.  Treatment Explanation - The following has been discussed with the patient:   RX ordered/plan of care  Anticipated outcomes  Possible risks and side effects  This patient would benefit from PT intervention to resume normal activities.   Rehab potential is good.    Frequency:  2 X week, once daily  Duration:  for 4 weeks  Discharge Plan:  Achieve all  LTG.  Independent in home treatment program.  Reach maximal therapeutic benefit.    Please refer to the daily flowsheet for treatment today, total treatment time and time spent performing 1:1 timed codes.

## 2018-06-26 NOTE — LETTER
[facility]  [address]  [place] [address]  [phone]    2018    Re: Kyung Adair   :   1960  MRN:  [id number]   REFERRING PHYSICIAN:   Jasmina Fortune    [facility]    Date of Initial Evaluation:  2018  Visits:  Rxs Used: 1  Reason for Referral:  Primary osteoarthritis of both knees    EVALUATION SUMMARY    [facility]  Subjective:  HPI Comments: MD orders include bilateral knees. Patient stating she is seeking treatment for her left knee today.    Patient is a 57 year old female presenting with rehab left knee hpi.   Kyung Adair is a 57 year old female with a bilateral knees (L>R) condition.  Condition occurred with: Insidious onset.  This is a chronic and recurrent condition.  Patient saw MD on 18 for L knee pain that began ~4 months ago. Had been seeing chiroprator for 6-8 months prior.    Patient reports pain: Anterior and in the joint.  Pain is described as stabbing, shooting, sharp, cramping, burning and aching and is intermittent and reported as 6/10 and 10/10 (at its worst).  Associated symptoms: Buckling/giving out, loss of motion/stiffness and loss of strength.  Pain is the same all the time (activity dependent).  Symptoms are exacerbated by descending stairs, ascending stairs, bending/squatting, kneeling, standing, walking and transfers (sitting with knee bent) and relieved by NSAID's.  Since onset symptoms are gradually worsening.  Special tests:  X-ray.  Previous treatment includes physical therapy ( for knees).  There was significant improvement following previous treatment.  General health as reported by patient is good.  Pertinent medical history includes: Asthma, cancer, overweight, thyroid problems, menopausal and sleep disorder/apnea.  Medical allergies: yes.  Other surgeries  include: Orthopedic surgery and cancer surgery (bilateral RCR, most recent 2016, s/p hysterectomy).  Current medications: Heparin/coumadin (Metformin).  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  Primary job tasks include: Repetitive tasks and prolonged sitting (computer work).  Barriers: Basement stairs for laundry.  Red flags:  None as reported by the patient.    Objective:  Standing Alignment:    Knee:  Genu recuvatum L, genu recurvatum R, genu valgus L and genu valgus R  Gait:    Gait Type:  Antalgic   Assistive Devices:  None    Re: Kyung Adair   :   1960    Deviations:  Ankle:  Heel strike decr L and push off decr LGeneral Deviations:  Stance time decr, stride length decr, chanelle decr, base of support incr and toe out L    Non-Weight Bearing:    Knee:  Patellar lateral tilt L and patellar lateral tilt R  Flexibility/Screens:   Lower Extremity:  Decreased left lower extremity flexibility: Gastroc  Decreased right lower extremity flexibility: Gastroc    Hip Evaluation  Hip Strength:    Flexion:   Left: 4-/5   +/-  Pain:  Right: 4/5   -  Pain:  Abduction:  Left: 3+/5    +/-   Pain:Right: 4-/5   -   Pain:    Knee Evaluation:  ROM:    AROM  Hyperextension:  Left:  5    Right: 5  Extension:  Left: 0    Right:  0  Flexion: Left: 110    Right: 125  Pain: limiting left knee flexion    Strength:   Extension:  Left: 3+/5    Pain:+      Right: 4+/5    Pain:-  Flexion:  Left: 3/5    Pain:+      Right: 4+/5    Pain:-    Quad Set Left:  Poor    Pain: +   Quad Set Right:  Good    Pain: -    Palpation:    Left knee tenderness present at:  Medial Joint Line; Lateral Joint Line; Patellar Medial and Patellar Lateral    Edema:  Normal    Mobility Testing:    Patellofemoral Lateral:  Left: hypomobile        Functional Testing:    Proprioception:   Stork Balance Test:  Left:  Unable  Right:  Unable  % of Uninvolved:     Assessment/Plan:    Patient is a  57 year old female with L>R knee complaints.    Patient has the following significant findings with corresponding treatment plan.                Diagnosis 1:  Knee OA/PF pain  Pain -  hot/cold therapy, manual therapy, self management, education, directional preference exercise and home program  Decreased ROM/flexibility - manual therapy, therapeutic exercise, therapeutic activity and home program  Decreased joint mobility - manual therapy, therapeutic exercise, therapeutic activity and home program  Re: Kyung Adair   :   1960    Decreased strength - therapeutic exercise, therapeutic activities and home program  Decreased proprioception - neuro re-education, gait training, therapeutic activities and home program  Impaired gait - gait training, assistive devices and home program  Decreased function - therapeutic activities and home program    Therapy Evaluation Codes:   1) History comprised of:   Personal factors that impact the plan of care:      Living environment and Profession.    Comorbidity factors that impact the plan of care are:  None.     Medications impacting care: None.  2) Examination of Body Systems comprised of:   Body structures and functions that impact the plan of care:      Knee.   Activity limitations that impact the plan of care are:      Sitting, Stairs, Standing, Walking and Working.  3) Clinical presentation characteristics are:   Stable/Uncomplicated.  4) Decision-Making    Low complexity using standardized patient assessment instrument and/or   measureable assessment of functional outcome.  Cumulative Therapy Evaluation is: Low complexity.    Previous and current functional limitations:  (See Goal Flow Sheet for this information)    Short term and Long term goals: (See Goal Flow Sheet for this information)     Communication ability:  Patient appears to be able to clearly communicate and understand verbal and written communication and follow directions correctly.  Treatment  Explanation - The following has been discussed with the patient:   RX ordered/plan of care  Anticipated outcomes  Possible risks and side effects  This patient would benefit from PT intervention to resume normal activities.   Rehab potential is good.    Frequency:  2 X week, once daily  Duration:  for 4 weeks  Discharge Plan:  Achieve all LTG.  Independent in home treatment program.  Reach maximal therapeutic benefit.    Thank you for your referral.    INQUIRIES  Therapist: Carol Gibson PT  [facility]  [address] [address]  Phone: [phone]  Fax: [phone]

## 2018-06-26 NOTE — MR AVS SNAPSHOT
After Visit Summary   6/26/2018    Kyung Adair    MRN: [id number]           Patient Information     Date Of Birth          1960        Visit Information        Provider Department      6/26/2018 4:10 PM Carol Noyola, PT [facility] Physical Therapy        Today's Diagnoses     Primary osteoarthritis of both knees    -  1       Follow-ups after your visit        Your next 10 appointments already scheduled     Jun 29, 2018 12:50 PM CDT   HORTENCIA Extremity with Ghazala Rea PTA   [facility] Physical Therapy ([facility])    [address] [place] [address] [address] [place] [address]   [phone]            Jul 02, 2018  4:10 PM CDT   HORTENCIA Extremity with Ghazala Rea PTA   [facility] Physical Therapy ([facility])    [address] [place] [address] [address] [place] [address]   [phone]            Jul 11, 2018  4:10 PM CDT   HORTENCIA Extremity with Ghazala Rea PTA   [facility] Physical Therapy ([facility])    [address] [place] [address] [address] [place] [address]   [phone]            Jul 18, 2018  4:10 PM CDT   HORTENCIA Extremity with Florence Morales, PT   [facility] Physical Therapy ([facility])    [address] [place] [address] [address] [place] [address]   [phone]            Jul 25, 2018  4:10 PM CDT   HORTENCIA Extremity with Ghazala Rea PTA   [facility] Physical Therapy ([facility])    [address] [place] [address] [address] [place] [address]   [phone]              Who to contact     If you have questions or need follow up information about today's clinic visit or your schedule please contact [facility] PHYSICAL THERAPY directly at  [phone].  Normal or non-critical lab and imaging results will be communicated to you by MyChart, letter or phone within 4 business days after the clinic has received the results. If you do not hear from us within 7 days, please contact the clinic through Sapiens[employer] or phone. If you have a critical or abnormal lab result, we will notify you by phone as soon as possible.  Submit refill requests through [employer] or call your pharmacy and they will forward the refill request to us. Please allow 3 business days for your refill to be completed.          Additional Information About Your Visit        Sapienshart Information     [employer] gives you secure access to your electronic health record. If you see a primary care provider, you can also send messages to your care team and make appointments. If you have questions, please call your primary care clinic.  If you do not have a primary care provider, please call [phone] and they will assist you.        Care EveryWhere ID     This is your Care EveryWhere ID. This could be used by other organizations to access your [facility] medical records  [id number]         Blood Pressure from Last 3 Encounters:   08/25/16 130/66   08/10/16 (!) 158/97   08/14/13 (!) 147/100    Weight from Last 3 Encounters:   08/24/16 132.5 kg (292 lb 1.8 oz)   08/10/16 133.8 kg (295 lb)   07/01/16 131.5 kg (290 lb)              We Performed the Following     HC PT EVAL, LOW COMPLEXITY     HORTENCIA INITIAL EVAL REPORT     THERAPEUTIC EXERCISES        Primary Care Provider Office Phone # Fax #    Bell BUCKNER Srikanth [phone]       [facility] [address] [facility] [address]        Equal Access to Services     JAYDEN [facility] : Hadii harpal Villarreal, waaxda luqadaha, qaybta kaalmada medhat, kaye howell. So [facility] [phone].    ATENCIÓN: Si habla español, tiene a sotomayor disposición servicios gratuitos de asistencia lingüística. Llame al  [phone].    We comply with applicable federal civil rights laws and [place] laws. We do not discriminate on the basis of race, color, national origin, age, disability, sex, sexual orientation, or gender identity.            Thank you!     Thank you for choosing [place] [facility] PHYSICAL THERAPY  for your care. Our goal is always to provide you with excellent care. Hearing back from our patients is one way we can continue to improve our services. Please take a few minutes to complete the written survey that you may receive in the mail after your visit with us. Thank you!             Your Updated Medication List - Protect others around you: Learn how to safely use, store and throw away your medicines at www.disposemymeds.org.          This list is accurate as of 6/26/18 11:18 PM.  Always use your most recent med list.                   Brand Name Dispense Instructions for use Diagnosis    albuterol 90 MCG/ACT inhaler      Inhale 2 puffs into the lungs every 6 hours as needed.        CALCIUM 500 + D PO      Take 1 tablet by mouth 2 times daily.        COUMADIN 5 MG tablet   Generic drug:  warfarin     30 tablet    Take 1.5 tablets (7.5 mg) by mouth daily    S/P rotator cuff repair       enoxaparin 40 MG/0.4ML injection    LOVENOX    4 mL    Inject 0.4 mLs (40 mg) Subcutaneous every 24 hours    Chronic anticoagulation       GLUCOPHAGE 850 MG tablet   Generic drug:  metFORMIN      Take 850 mg by mouth 2 times daily (with meals).        heparin sodium PF 5000 UNIT/0.5ML injection     2 mL    Inject 0.5 mLs (5,000 Units) Subcutaneous every 8 hours    S/P rotator cuff repair       oxyCODONE IR 5 MG tablet    ROXICODONE    80 tablet    Take 1-2 tablets (5-10 mg) by mouth every 4 hours as needed for moderate to severe pain    S/P rotator cuff repair       QVAR 80 MCG/ACT Inhaler   Generic drug:  beclomethasone      Inhale 1 puff into the lungs as needed        senna-docusate 8.6-50 MG per  tablet    SENOKOT-S;PERICOLACE    30 tablet    Take 1-2 tablets by mouth 2 times daily    S/P rotator cuff repair       SYNTHROID 137 MCG tablet   Generic drug:  levothyroxine      Take 137 mcg by mouth daily        valACYclovir 500 MG tablet    VALTREX

## 2018-07-02 ENCOUNTER — THERAPY VISIT (OUTPATIENT)
Dept: PHYSICAL THERAPY | Facility: CLINIC | Age: 58
End: 2018-07-02
Payer: COMMERCIAL

## 2018-07-02 DIAGNOSIS — M17.0 PRIMARY OSTEOARTHRITIS OF BOTH KNEES: ICD-10-CM

## 2018-07-02 PROCEDURE — 97112 NEUROMUSCULAR REEDUCATION: CPT | Mod: GP | Performed by: PHYSICAL THERAPY ASSISTANT

## 2018-07-02 PROCEDURE — 97110 THERAPEUTIC EXERCISES: CPT | Mod: GP | Performed by: PHYSICAL THERAPY ASSISTANT

## 2018-07-11 ENCOUNTER — THERAPY VISIT (OUTPATIENT)
Dept: PHYSICAL THERAPY | Facility: CLINIC | Age: 58
End: 2018-07-11
Payer: COMMERCIAL

## 2018-07-11 DIAGNOSIS — M17.0 PRIMARY OSTEOARTHRITIS OF BOTH KNEES: ICD-10-CM

## 2018-07-11 PROCEDURE — 97110 THERAPEUTIC EXERCISES: CPT | Mod: GP | Performed by: PHYSICAL THERAPY ASSISTANT

## 2018-07-25 ENCOUNTER — THERAPY VISIT (OUTPATIENT)
Dept: PHYSICAL THERAPY | Facility: CLINIC | Age: 58
End: 2018-07-25
Payer: COMMERCIAL

## 2018-07-25 DIAGNOSIS — M17.0 PRIMARY OSTEOARTHRITIS OF BOTH KNEES: ICD-10-CM

## 2018-07-25 PROCEDURE — 97110 THERAPEUTIC EXERCISES: CPT | Mod: GP | Performed by: PHYSICAL THERAPY ASSISTANT

## 2018-07-25 ASSESSMENT — ACTIVITIES OF DAILY LIVING (ADL)
SWELLING: THE SYMPTOM AFFECTS MY ACTIVITY MODERATELY
KNEE_ACTIVITY_OF_DAILY_LIVING_SUM: 23
PAIN: THE SYMPTOM AFFECTS MY ACTIVITY MODERATELY
RISE FROM A CHAIR: ACTIVITY IS FAIRLY DIFFICULT
WEAKNESS: THE SYMPTOM AFFECTS MY ACTIVITY SLIGHTLY
SIT WITH YOUR KNEE BENT: ACTIVITY IS SOMEWHAT DIFFICULT
KNEEL ON THE FRONT OF YOUR KNEE: I AM UNABLE TO DO THE ACTIVITY
AS_A_RESULT_OF_YOUR_KNEE_INJURY,_HOW_WOULD_YOU_RATE_YOUR_CURRENT_LEVEL_OF_DAILY_ACTIVITY?: SEVERELY ABNORMAL
LIMPING: THE SYMPTOM AFFECTS MY ACTIVITY SEVERELY
HOW_WOULD_YOU_RATE_THE_CURRENT_FUNCTION_OF_YOUR_KNEE_DURING_YOUR_USUAL_DAILY_ACTIVITIES_ON_A_SCALE_FROM_0_TO_100_WITH_100_BEING_YOUR_LEVEL_OF_KNEE_FUNCTION_PRIOR_TO_YOUR_INJURY_AND_0_BEING_THE_INABILITY_TO_PERFORM_ANY_OF_YOUR_USUAL_DAILY_ACTIVITIES?: 20
GIVING WAY, BUCKLING OR SHIFTING OF KNEE: THE SYMPTOM AFFECTS MY ACTIVITY MODERATELY
RAW_SCORE: 23
KNEE_ACTIVITY_OF_DAILY_LIVING_SCORE: 32.86
SQUAT: I AM UNABLE TO DO THE ACTIVITY
HOW_WOULD_YOU_RATE_THE_OVERALL_FUNCTION_OF_YOUR_KNEE_DURING_YOUR_USUAL_DAILY_ACTIVITIES?: SEVERELY ABNORMAL
STIFFNESS: THE SYMPTOM AFFECTS MY ACTIVITY MODERATELY
GO UP STAIRS: ACTIVITY IS VERY DIFFICULT
GO DOWN STAIRS: ACTIVITY IS VERY DIFFICULT
STAND: ACTIVITY IS FAIRLY DIFFICULT
WALK: ACTIVITY IS FAIRLY DIFFICULT

## 2018-07-25 NOTE — LETTER
[facility] PH[facility]ICAL THERAP[facility]  [address]  [facility] [place] [address]  [phone]    2018    Re: Kyung Adair   :   1960  MRN:  [id number]   REFERRING PHYSICIAN:   Jasmina Fortune    [facility] PH[facility]    Date of Initial Evaluation:  2018  Visits:  Rxs Used: 4  Reason for Referral:  Primary osteoarthritis of both knees    PROGRESS  REPORT  Progress reporting period is from 18 to 18.      SUBJECTIVE  HPI  Knee Activity of Daily Living Score: 32.86   Subjective changes noted by patient:    Pt has been seen for 4 PT sessions. Pt reports constant pain 2/10 in L knee with intermittent sharp pain up to 5/10. 2 weeks ago pt had increased pain up to 10/10 for unknown cause. Pt will use cane for outdoor ambulation. Pt questions further evaluation to discuss treatment options (TKA?).     Current Pain level: 2/10 (up to 5/10 at worst )    Previous pain level was:  6-10/10     Changes in function:  Yes (See Goal flowsheet attached for changes in current functional level)     Adverse reaction to treatment or activity: None     OBJECTIVE  Changes noted in objective findings:    L knee AROM: 0-112 deg with pain (had declined to 100 with flare up 2 weeks ago).   MMT L knee extension=4-/5 with pain, L knee flex=4/5, L hip abd=4/5.   Tenderness with palpation to medial joint line.   Antalgic gait with decreased heel to toe pattern, decreased step length-pattern does improve with use of cane. Step to pattern on stairs with railing.   Knee outcome survey has increased from 24-33% although still signifying significant disability.      ASSESSMENT/PLAN  Updated problem list and treatment plan: Diagnosis 1:  Left knee pain  Pain -  hot/cold therapy, self management, education and home program  Decreased ROM/flexibility - manual therapy, therapeutic exercise, therapeutic activity and home  program  Decreased strength - therapeutic exercise, therapeutic activities and home program  Decreased function - therapeutic activities and home program  STG/LTGs have been met:  Yes (See Goal flow sheet completed today.)  Re: Kyung Adair   :   1960    Progress toward STG/LTGs have been made:  Yes (See Goal flow sheet completed today.)  Assessment of Progress: The patient's condition is improving.  Progress limited by pain  Self Management Plans:  Patient has been instructed in a home treatment program.  Patient  has been instructed in self management of symptoms.  I have re-evaluated this patient and find that the nature, scope, duration and intensity of the therapy is appropriate for the medical condition of the patient.  Kyung continues to require the following intervention to meet STG and LT's:  PT    Recommendations:  This patient would benefit from further evaluation.  Patient still complains of pain and limited motion.  Please update with additional orders if recommending continuation of therapy.  The progress note/discharge summary was written in collaboration with and reviewed by the physical therapist.    Thank you for your referral.    INQUIRIES  Therapist: Florence Morales, PT   [facility] [place]ERAP[facility]  [address] [address]  Phone: [phone]  Fax: [phone]

## 2018-07-25 NOTE — MR AVS SNAPSHOT
After Visit Summary   7/25/2018    Kyung Adair    MRN: [id number]           Patient Information     Date Of Birth          1960        Visit Information        Provider Department      7/25/2018 4:10 PM Ghazala Rea PTA [facility] Physical Therapy        Today's Diagnoses     Primary osteoarthritis of both knees           Follow-ups after your visit        Who to contact     If you have questions or need follow up information about today's clinic visit or your schedule please contact [facility] PHYSICAL [facility] directly at [phone].  Normal or non-critical lab and imaging results will be communicated to you by [employer], letter or phone within 4 business days after the clinic has received the results. If you do not hear from us within 7 days, please contact the clinic through [employer] or phone. If you have a critical or abnormal lab result, we will notify you by phone as soon as possible.  Submit refill requests through [employer] or call your pharmacy and they will forward the refill request to us. Please allow 3 business days for your refill to be completed.          Additional Information About Your Visit        MyChart Information     [employer] gives you secure access to your electronic health record. If you see a primary care provider, you can also send messages to your care team and make appointments. If you have questions, please call your primary care clinic.  If you do not have a primary care provider, please call [phone] and they will assist you.        Care EveryWhere ID     This is your Care EveryWhere ID. This could be used by other organizations to access your [place] medical records  [id number]         Blood Pressure from Last 3 Encounters:   08/25/16 130/66   08/10/16 (!) 158/97   08/14/13 (!) 147/100    Weight from Last 3 Encounters:   08/24/16 132.5 kg (292 lb 1.8 oz)   08/10/16 133.8 kg  (295 lb)   07/01/16 131.5 kg (290 lb)              We Performed the Following     THERAPEUTIC EXERCISES        Primary Care Provider Office Phone # Fax #    Bell Duke [phone]       [facility] [address] [address]        Equal Access to Services     LISA WRAY : Hadii aad ku hadasho Soomaali, waaxda luqadaha, qaybta kaalmada adeegyada, waxay neemain hayluis carlosn adeshaun smalljesusecho howell. So [facility] [phone].    ATENCIÓN: Si habla español, tiene a sotomayor disposición servicios gratuitos de asistencia lingüística. Dalia al [phone].    We comply with applicable federal civil rights laws and [place] laws. We do not discriminate on the basis of race, color, national origin, age, disability, sex, sexual orientation, or gender identity.            Thank you!     Thank you for choosing [place] [facility] PHYSICAL THERAPY  for your care. Our goal is always to provide you with excellent care. Hearing back from our patients is one way we can continue to improve our services. Please take a few minutes to complete the written survey that you may receive in the mail after your visit with us. Thank you!             Your Updated Medication List - Protect others around you: Learn how to safely use, store and throw away your medicines at www.disposemymeds.org.          This list is accurate as of 7/25/18  6:41 PM.  Always use your most recent med list.                   Brand Name Dispense Instructions for use Diagnosis    albuterol 90 MCG/ACT inhaler      Inhale 2 puffs into the lungs every 6 hours as needed.        CALCIUM 500 + D PO      Take 1 tablet by mouth 2 times daily.        COUMADIN 5 MG tablet   Generic drug:  warfarin     30 tablet    Take 1.5 tablets (7.5 mg) by mouth daily    S/P rotator cuff repair       enoxaparin 40 MG/0.4ML injection    LOVENOX    4 mL    Inject 0.4 mLs (40 mg) Subcutaneous every 24 hours    Chronic  anticoagulation       GLUCOPHAGE 850 MG tablet   Generic drug:  metFORMIN      Take 850 mg by mouth 2 times daily (with meals).        heparin sodium PF 5000 UNIT/0.5ML injection     2 mL    Inject 0.5 mLs (5,000 Units) Subcutaneous every 8 hours    S/P rotator cuff repair       oxyCODONE IR 5 MG tablet    ROXICODONE    80 tablet    Take 1-2 tablets (5-10 mg) by mouth every 4 hours as needed for moderate to severe pain    S/P rotator cuff repair       QVAR 80 MCG/ACT Inhaler   Generic drug:  beclomethasone      Inhale 1 puff into the lungs as needed        senna-docusate 8.6-50 MG per tablet    SENOKOT-S;PERICOLACE    30 tablet    Take 1-2 tablets by mouth 2 times daily    S/P rotator cuff repair       SYNTHROID 137 MCG tablet   Generic drug:  levothyroxine      Take 137 mcg by mouth daily        valACYclovir 500 MG tablet    VALTREX

## 2018-07-25 NOTE — PROGRESS NOTES
Subjective:  HPI       Knee Activity of Daily Living Score: 32.86            Objective:  System    Physical Exam    General     ROS    Assessment/Plan:    PROGRESS  REPORT    Progress reporting period is from 6/26/18 to 7/25/18.      SUBJECTIVE  Subjective changes noted by patient:    Pt has been seen for 4 PT sessions. Pt reports constant pain 2/10 in L knee with intermittent sharp pain up to 5/10. 2 weeks ago pt had increased pain up to 10/10 for unknown cause. Pt will use cane for outdoor ambulation. Pt questions further evaluation to discuss treatment options (TKA?).     Current Pain level: 2/10 (up to 5/10 at worst )    Previous pain level was:  6-10/10     Changes in function:  Yes (See Goal flowsheet attached for changes in current functional level)     Adverse reaction to treatment or activity: None     OBJECTIVE  Changes noted in objective findings:    L knee AROM: 0-112 deg with pain (had declined to 100 with flare up 2 weeks ago).   MMT L knee extension=4-/5 with pain, L knee flex=4/5, L hip abd=4/5.   Tenderness with palpation to medial joint line.   Antalgic gait with decreased heel to toe pattern, decreased step length-pattern does improve with use of cane. Step to pattern on stairs with railing.   Knee outcome survey has increased from 24-33% although still signifying significant disability.

## 2018-07-26 NOTE — PROGRESS NOTES
Subjective:  HPI       Knee Activity of Daily Living Score: 32.86            Objective:  System    Physical Exam    General     ROS    Assessment/Plan:    ASSESSMENT/PLAN  Updated problem list and treatment plan: Diagnosis 1:  Left knee pain  Pain -  hot/cold therapy, self management, education and home program  Decreased ROM/flexibility - manual therapy, therapeutic exercise, therapeutic activity and home program  Decreased strength - therapeutic exercise, therapeutic activities and home program  Decreased function - therapeutic activities and home program  STG/LTGs have been met:  Yes (See Goal flow sheet completed today.)  Progress toward STG/LTGs have been made:  Yes (See Goal flow sheet completed today.)  Assessment of Progress: The patient's condition is improving.  Progress limited by pain  Self Management Plans:  Patient has been instructed in a home treatment program.  Patient  has been instructed in self management of symptoms.  I have re-evaluated this patient and find that the nature, scope, duration and intensity of the therapy is appropriate for the medical condition of the patient.  Kyung continues to require the following intervention to meet STG and LT's:  PT    Recommendations:  This patient would benefit from further evaluation.  Patient still complains of pain and limited motion.  Please update with additional orders if recommending continuation of therapy.  The progress note/discharge summary was written in collaboration with and reviewed by the physical therapist.    Please refer to the daily flowsheet for treatment today, total treatment time and time spent performing 1:1 timed codes.

## 2019-03-06 NOTE — PROGRESS NOTES
Patient did not return for further treatment and no additional progress was noted.  Please refer to the progress note and goal flowsheet completed on 07/25/18 for discharge information.

## 2019-09-29 ENCOUNTER — HEALTH MAINTENANCE LETTER (OUTPATIENT)
Age: 59
End: 2019-09-29

## 2020-07-15 ENCOUNTER — APPOINTMENT (OUTPATIENT)
Age: 60
Setting detail: DERMATOLOGY
End: 2020-07-20

## 2020-07-15 VITALS — RESPIRATION RATE: 15 BRPM | HEIGHT: 55 IN | WEIGHT: 286 LBS

## 2020-07-15 DIAGNOSIS — L82.1 OTHER SEBORRHEIC KERATOSIS: ICD-10-CM

## 2020-07-15 DIAGNOSIS — L81.4 OTHER MELANIN HYPERPIGMENTATION: ICD-10-CM

## 2020-07-15 DIAGNOSIS — D22 MELANOCYTIC NEVI: ICD-10-CM

## 2020-07-15 DIAGNOSIS — B35.3 TINEA PEDIS: ICD-10-CM

## 2020-07-15 DIAGNOSIS — B07.8 OTHER VIRAL WARTS: ICD-10-CM

## 2020-07-15 PROBLEM — D23.5 OTHER BENIGN NEOPLASM OF SKIN OF TRUNK: Status: ACTIVE | Noted: 2020-07-15

## 2020-07-15 PROBLEM — D22.71 MELANOCYTIC NEVI OF RIGHT LOWER LIMB, INCLUDING HIP: Status: ACTIVE | Noted: 2020-07-15

## 2020-07-15 PROBLEM — D22.5 MELANOCYTIC NEVI OF TRUNK: Status: ACTIVE | Noted: 2020-07-15

## 2020-07-15 PROBLEM — D48.5 NEOPLASM OF UNCERTAIN BEHAVIOR OF SKIN: Status: ACTIVE | Noted: 2020-07-15

## 2020-07-15 PROBLEM — D23.72 OTHER BENIGN NEOPLASM OF SKIN OF LEFT LOWER LIMB, INCLUDING HIP: Status: ACTIVE | Noted: 2020-07-15

## 2020-07-15 PROCEDURE — OTHER LIQUID NITROGEN: OTHER

## 2020-07-15 PROCEDURE — OTHER PATHOLOGY BILLING: OTHER

## 2020-07-15 PROCEDURE — OTHER BIOPSY BY SHAVE METHOD: OTHER

## 2020-07-15 PROCEDURE — OTHER PRESCRIPTION: OTHER

## 2020-07-15 PROCEDURE — 17110 DESTRUCT B9 LESION 1-14: CPT

## 2020-07-15 PROCEDURE — 88305 TISSUE EXAM BY PATHOLOGIST: CPT

## 2020-07-15 PROCEDURE — OTHER COUNSELING: OTHER

## 2020-07-15 PROCEDURE — 11102 TANGNTL BX SKIN SINGLE LES: CPT | Mod: 59

## 2020-07-15 PROCEDURE — 99214 OFFICE O/P EST MOD 30 MIN: CPT | Mod: 25

## 2020-07-15 RX ORDER — KETOCONAZOLE 20 MG/G
2% CREAM TOPICAL BID
Qty: 1 | Refills: 1 | Status: ERX | COMMUNITY
Start: 2020-07-15

## 2020-07-15 ASSESSMENT — LOCATION ZONE DERM
LOCATION ZONE: LEG
LOCATION ZONE: FINGER
LOCATION ZONE: TRUNK
LOCATION ZONE: ARM
LOCATION ZONE: FEET

## 2020-07-15 ASSESSMENT — LOCATION SIMPLE DESCRIPTION DERM
LOCATION SIMPLE: RIGHT PLANTAR SURFACE
LOCATION SIMPLE: CHEST
LOCATION SIMPLE: RIGHT SHOULDER
LOCATION SIMPLE: LEFT LOWER BACK
LOCATION SIMPLE: LEFT SHOULDER
LOCATION SIMPLE: RIGHT POSTERIOR THIGH
LOCATION SIMPLE: RIGHT LOWER BACK
LOCATION SIMPLE: LEFT UPPER BACK
LOCATION SIMPLE: RIGHT MIDDLE FINGER
LOCATION SIMPLE: RIGHT FOOT
LOCATION SIMPLE: ABDOMEN

## 2020-07-15 ASSESSMENT — LOCATION DETAILED DESCRIPTION DERM
LOCATION DETAILED: LEFT POSTERIOR SHOULDER
LOCATION DETAILED: LEFT INFERIOR UPPER BACK
LOCATION DETAILED: EPIGASTRIC SKIN
LOCATION DETAILED: RIGHT DORSAL FOOT
LOCATION DETAILED: LEFT INFERIOR MEDIAL MIDBACK
LOCATION DETAILED: RIGHT MID DORSAL MIDDLE FINGER
LOCATION DETAILED: RIGHT MEDIAL PLANTAR MIDFOOT
LOCATION DETAILED: LOWER STERNUM
LOCATION DETAILED: RIGHT PROXIMAL POSTERIOR THIGH
LOCATION DETAILED: RIGHT SUPERIOR LATERAL MIDBACK
LOCATION DETAILED: MIDDLE STERNUM
LOCATION DETAILED: RIGHT POSTERIOR SHOULDER

## 2020-07-15 NOTE — PROCEDURE: BIOPSY BY SHAVE METHOD
Depth Of Biopsy: dermis
Render Post-Care Instructions In Note?: no
Silver Nitrate Text: The wound bed was treated with silver nitrate after the biopsy was performed.
Information: Selecting Yes will display possible errors in your note based on the variables you have selected. This validation is only offered as a suggestion for you. PLEASE NOTE THAT THE VALIDATION TEXT WILL BE REMOVED WHEN YOU FINALIZE YOUR NOTE. IF YOU WANT TO FAX A PRELIMINARY NOTE YOU WILL NEED TO TOGGLE THIS TO 'NO' IF YOU DO NOT WANT IT IN YOUR FAXED NOTE.
Render In Bullet Format When Appropriate: Yes
Notification Instructions: - It can take up to 2 weeks to get a biopsy result. \\n- Please refrain from calling to request results until after 2 weeks.
Post-Care Instructions: WOUND CARE:\\nDo NOT submerge wound in a bath, swimming pool, or hot tub for at least 1 week or for as long as there is an open wound. Gently cleanse the site daily with mild soap and water. Be careful NOT to allow a forceful stream of water to hit the biopsy site. After cleaning/showering, apply a thin layer of petrolatum ointment or Aquaphor in the wound followed by an adhesive bandage. Continue this process daily until healed. \\n\\nBLEEDING:\\nIf you develop persistent bleeding, apply firm and steady pressure over the dressing with gauze for 15 minutes. If bleeding persists, reapply pressure with an ice pack over the gauze for 15 minutes. NEVER APPLY ICE DIRECTLY TO THE SKIN. Do NOT peek under the gauze during these 15 minutes of pressure.  Call our office at [phone] if you cannot get the bleeding to stop. \\n\\nINFECTION:\\nSigns of infection may include increasing rather than decreasing pain (after the first few days), increasing redness/swelling/heat, draining pus, pink/red streaks around the wound, and fever or chills.  Please call our office immediately at [phone] if infection is suspected. It is normal to have some mild redness on or around the wound edges; this will lighten day by day and will become less tender.\\n\\nPAIN:\\nPain is usually minimal, but if needed you may take acetaminophen (Tylenol) every four hours as needed. Applying an ice pack over the dressing for 15-20 minutes every 2-3 hours will relieve swelling, lessen pain, and help minimize bruising. NEVER APPLY ICE DIRECTLY TO THE SKIN. Avoid ibuprofen (Advil, Motrin) and naproxen (Aleve) for the first 48 hours as these can increase bleeding.\\n\\nSWELLIG AND BRUISING:\\nSwelling and bruising are common and temporary, usually lasting 1 - 2 weeks. It is more common in areas treated around the eyes, hands, and feet. In the days following the procedure, swelling and bruising can be minimized by keeping the affected areas elevated when possible, reducing salty foods, and applying ice packs over the dressing for 15-20 minutes every 2-3 hours. NEVER APPLY ICE DIRECTLY TO THE SKIN.\\n\\nITCHING:\\nItchiness on and around the wound is very common and can last several days to weeks after surgery. Mild itch is normal as the wound is healing. \\n\\nNERVE CHANGES:\\nNumbness is usually temporary, but it may last for several weeks to months. You may also experience sharp pains at the wound sight as it heals.  Mild pain is normal and will gradually improve with time.\\n \\nNO SMOKING:\\nSmoking will delay the healing process. If you smoke, we recommend trying to quit or at minimum reduce how much you smoke following a procedure.
Dressing: bandage
Electrodesiccation Text: The wound bed was treated with electrodesiccation after the biopsy was performed.
Anesthesia Volume In Cc (Will Not Render If 0): 0.5
Curettage Text: The wound bed was treated with curettage after the biopsy was performed.
Electrodesiccation And Curettage Text: The wound bed was treated with electrodesiccation and curettage after the biopsy was performed.
Billing Type: Third-Party Bill
X Size Of Lesion In Cm: 0
Wound Care: Petrolatum
Biopsy Type: H and E
Hemostasis: Aluminum Chloride
Biopsy Method: Dermablade
Anesthesia Type: 2% lidocaine with epinephrine
Detail Level: Detailed
Cryotherapy Text: The wound bed was treated with cryotherapy after the biopsy was performed.
Consent: - Verbal and written consent was obtained and risks were reviewed prior to procedure today. \\n- Risks discussed include but are not limited to scarring, infection, bleeding, scabbing, incomplete removal, nerve damage, pain, and allergy to anesthesia.
Type Of Destruction Used: Electrodesiccation

## 2020-07-15 NOTE — PROCEDURE: LIQUID NITROGEN
Post-Care Instructions: I reviewed with the patient in detail post-care instructions. Patient is to wear sunprotection, and avoid picking at any of the treated lesions. Pt may apply Vaseline to crusted or scabbing areas.
Render Note In Bullet Format When Appropriate: No
Medical Necessity Information: It is in your best interest to select a reason for this procedure from the list below. All of these items fulfill various CMS LCD requirements except the new and changing color options.
Consent: The patient's consent was obtained including but not limited to risks of crusting, scabbing, blistering, scarring, darker or lighter pigmentary change, recurrence, incomplete removal and infection.
Medical Necessity Clause: This procedure was medically necessary because the lesions that were treated were:
Detail Level: Detailed

## 2020-07-15 NOTE — PROCEDURE: PATHOLOGY BILLING
Immunohistochemistry (15880 and 11317) billing is not performed here. Please use the Immunohistochemistry Stain Billing plan to accomplish this. Immunohistochemistry (02028 and 64780) billing is not performed here. Please use the Immunohistochemistry Stain Billing plan to accomplish this.

## 2020-07-15 NOTE — PROCEDURE: COUNSELING
Detail Level: Generalized
Detail Level: Detailed
Detail Level: Zone
Patient Specific Counseling (Will Not Stick From Patient To Patient): - Advised that this lesion has features that suggest a skin cancer so a biopsy is necessary to confirm the diagnosis and guide treatment.\\n- Patient expressed understanding. \\n- If skin cancer is confirmed, further treatment will be necessary.
Detail Level: Simple

## 2020-08-26 ENCOUNTER — APPOINTMENT (OUTPATIENT)
Dept: URBAN - METROPOLITAN AREA CLINIC 254 | Age: 60
Setting detail: DERMATOLOGY
End: 2020-08-31

## 2020-08-26 VITALS — RESPIRATION RATE: 16 BRPM | HEIGHT: 65 IN | WEIGHT: 286 LBS

## 2020-08-26 DIAGNOSIS — B07.8 OTHER VIRAL WARTS: ICD-10-CM

## 2020-08-26 DIAGNOSIS — L57.0 ACTINIC KERATOSIS: ICD-10-CM

## 2020-08-26 PROCEDURE — 17110 DESTRUCT B9 LESION 1-14: CPT

## 2020-08-26 PROCEDURE — 17000 DESTRUCT PREMALG LESION: CPT | Mod: 59

## 2020-08-26 PROCEDURE — OTHER COUNSELING: OTHER

## 2020-08-26 PROCEDURE — OTHER LIQUID NITROGEN: OTHER

## 2020-08-26 PROCEDURE — 99213 OFFICE O/P EST LOW 20 MIN: CPT | Mod: 25

## 2020-08-26 PROCEDURE — OTHER ADDITIONAL NOTES: OTHER

## 2020-08-26 ASSESSMENT — LOCATION SIMPLE DESCRIPTION DERM
LOCATION SIMPLE: RIGHT MIDDLE FINGER
LOCATION SIMPLE: NOSE

## 2020-08-26 ASSESSMENT — LOCATION ZONE DERM
LOCATION ZONE: FINGER
LOCATION ZONE: NOSE

## 2020-08-26 ASSESSMENT — LOCATION DETAILED DESCRIPTION DERM
LOCATION DETAILED: NASAL DORSUM
LOCATION DETAILED: RIGHT MID DORSAL MIDDLE FINGER

## 2020-08-26 NOTE — PROCEDURE: ADDITIONAL NOTES
Additional Notes: -Base not seen on path.\\n-Discussed cryo vs additional biopsy.
Detail Level: Simple

## 2020-08-26 NOTE — PROCEDURE: LIQUID NITROGEN
Medical Necessity Clause: This procedure was medically necessary because the lesions that were treated were:
Duration Of Freeze Thaw-Cycle (Seconds): 0
Detail Level: Detailed
Render Note In Bullet Format When Appropriate: No
Post-Care Instructions: I reviewed with the patient in detail post-care instructions. Patient is to wear sunprotection, and avoid picking at any of the treated lesions. Pt may apply Vaseline to crusted or scabbing areas.
Consent: The patient's consent was obtained including but not limited to risks of crusting, scabbing, blistering, scarring, darker or lighter pigmentary change, recurrence, incomplete removal and infection.
Medical Necessity Information: It is in your best interest to select a reason for this procedure from the list below. All of these items fulfill various CMS LCD requirements except the new and changing color options.

## 2020-09-23 ENCOUNTER — APPOINTMENT (OUTPATIENT)
Dept: URBAN - METROPOLITAN AREA CLINIC 254 | Age: 60
Setting detail: DERMATOLOGY
End: 2020-09-29

## 2020-09-23 VITALS — WEIGHT: 285 LBS | RESPIRATION RATE: 16 BRPM | HEIGHT: 65 IN

## 2020-09-23 DIAGNOSIS — L82.1 OTHER SEBORRHEIC KERATOSIS: ICD-10-CM

## 2020-09-23 DIAGNOSIS — L57.0 ACTINIC KERATOSIS: ICD-10-CM

## 2020-09-23 DIAGNOSIS — L82.0 INFLAMED SEBORRHEIC KERATOSIS: ICD-10-CM

## 2020-09-23 PROCEDURE — 17110 DESTRUCT B9 LESION 1-14: CPT

## 2020-09-23 PROCEDURE — OTHER ADDITIONAL NOTES: OTHER

## 2020-09-23 PROCEDURE — 99213 OFFICE O/P EST LOW 20 MIN: CPT | Mod: 25

## 2020-09-23 PROCEDURE — OTHER COUNSELING: OTHER

## 2020-09-23 PROCEDURE — 17000 DESTRUCT PREMALG LESION: CPT | Mod: 59

## 2020-09-23 PROCEDURE — OTHER LIQUID NITROGEN: OTHER

## 2020-09-23 ASSESSMENT — LOCATION DETAILED DESCRIPTION DERM
LOCATION DETAILED: NASAL DORSUM
LOCATION DETAILED: RIGHT CLAVICULAR NECK
LOCATION DETAILED: RIGHT MEDIAL SUPERIOR CHEST
LOCATION DETAILED: LEFT LATERAL SUPERIOR CHEST
LOCATION DETAILED: MIDDLE STERNUM
LOCATION DETAILED: LEFT ANTERIOR SHOULDER
LOCATION DETAILED: RIGHT ANTERIOR SHOULDER
LOCATION DETAILED: RIGHT POSTERIOR SHOULDER
LOCATION DETAILED: RIGHT LATERAL SUPERIOR CHEST

## 2020-09-23 ASSESSMENT — LOCATION SIMPLE DESCRIPTION DERM
LOCATION SIMPLE: LEFT SHOULDER
LOCATION SIMPLE: RIGHT ANTERIOR NECK
LOCATION SIMPLE: CHEST
LOCATION SIMPLE: NOSE
LOCATION SIMPLE: RIGHT SHOULDER

## 2020-09-23 ASSESSMENT — LOCATION ZONE DERM
LOCATION ZONE: NECK
LOCATION ZONE: ARM
LOCATION ZONE: NOSE
LOCATION ZONE: TRUNK

## 2020-09-23 NOTE — PROCEDURE: ADDITIONAL NOTES
Additional Notes: -Treated with CRYO for the second time today.\\n-Path stated HAK Base not seen\\n-Will recheck at next visit at end of November, and consider topical imiqumod treatment if still present.
Detail Level: Simple

## 2020-09-23 NOTE — PROCEDURE: LIQUID NITROGEN
Consent: The patient's consent was obtained including but not limited to risks of crusting, scabbing, blistering, scarring, darker or lighter pigmentary change, recurrence, incomplete removal and infection.
Render Note In Bullet Format When Appropriate: No
Detail Level: Detailed
Post-Care Instructions: I reviewed with the patient in detail post-care instructions. Patient is to wear sunprotection, and avoid picking at any of the treated lesions. Pt may apply Vaseline to crusted or scabbing areas.
Medical Necessity Clause: This procedure was medically necessary because the lesions that were treated were:
Duration Of Freeze Thaw-Cycle (Seconds): 0
Medical Necessity Information: It is in your best interest to select a reason for this procedure from the list below. All of these items fulfill various CMS LCD requirements except the new and changing color options.

## 2020-11-11 ENCOUNTER — RX ONLY (RX ONLY)
Age: 60
End: 2020-11-11

## 2020-11-11 ENCOUNTER — APPOINTMENT (OUTPATIENT)
Dept: URBAN - METROPOLITAN AREA CLINIC 254 | Age: 60
Setting detail: DERMATOLOGY
End: 2020-11-18

## 2020-11-11 VITALS — WEIGHT: 283 LBS | RESPIRATION RATE: 14 BRPM | HEIGHT: 64 IN

## 2020-11-11 DIAGNOSIS — L57.0 ACTINIC KERATOSIS: ICD-10-CM

## 2020-11-11 DIAGNOSIS — L21.8 OTHER SEBORRHEIC DERMATITIS: ICD-10-CM

## 2020-11-11 DIAGNOSIS — L71.0 PERIORAL DERMATITIS: ICD-10-CM

## 2020-11-11 PROCEDURE — 99214 OFFICE O/P EST MOD 30 MIN: CPT

## 2020-11-11 PROCEDURE — OTHER PRESCRIPTION SAMPLES PROVIDED: OTHER

## 2020-11-11 PROCEDURE — OTHER COUNSELING: OTHER

## 2020-11-11 PROCEDURE — OTHER ADDITIONAL NOTES: OTHER

## 2020-11-11 PROCEDURE — OTHER PRESCRIPTION: OTHER

## 2020-11-11 RX ORDER — FLUOCINONIDE 0.5 MG/ML
0.05% SOLUTION TOPICAL BID
Qty: 1 | Refills: 1 | Status: CANCELLED

## 2020-11-11 RX ORDER — KETOCONAZOLE 20 MG/ML
SHAMPOO, SUSPENSION TOPICAL BIW
Qty: 1 | Refills: 2 | Status: ERX | COMMUNITY
Start: 2020-11-11

## 2020-11-11 RX ORDER — IMIQUIMOD 12.5 MG/.25G
5% CREAM TOPICAL AS DIRECTED
Qty: 1 | Refills: 1 | Status: ERX | COMMUNITY
Start: 2020-11-11

## 2020-11-11 RX ORDER — BETAMETHASONE DIPROPIONATE 0.5 MG/ML
LOTION TOPICAL
Qty: 1 | Refills: 1 | Status: ERX | COMMUNITY
Start: 2020-11-11

## 2020-11-11 ASSESSMENT — LOCATION DETAILED DESCRIPTION DERM
LOCATION DETAILED: LEFT MEDIAL BUCCAL CHEEK
LOCATION DETAILED: NASAL DORSUM
LOCATION DETAILED: RIGHT SUPERIOR MEDIAL BUCCAL CHEEK
LOCATION DETAILED: LEFT MEDIAL FRONTAL SCALP

## 2020-11-11 ASSESSMENT — LOCATION ZONE DERM
LOCATION ZONE: FACE
LOCATION ZONE: SCALP
LOCATION ZONE: NOSE

## 2020-11-11 ASSESSMENT — LOCATION SIMPLE DESCRIPTION DERM
LOCATION SIMPLE: NOSE
LOCATION SIMPLE: RIGHT CHEEK
LOCATION SIMPLE: LEFT CHEEK
LOCATION SIMPLE: LEFT SCALP

## 2020-11-11 NOTE — PROCEDURE: ADDITIONAL NOTES
Additional Notes: Patient stated she thinks this may be a reaction to allopurinol, as she just started this medication. KL reassured patient this is not likely from allopurinol. \\nDiscussed sending over Ketoconazole 2% shampoo as well, as patient has hit her deductible
Detail Level: Simple
Additional Notes: HAK on patients nose has been treated twice with liquid nitrogen. Biopsy showed HAK extending to the base. Will treat with imiquimod for 4 weeks. Patient would like to wait until after the holidays to treat. KL reassured patient this is ok and to treat in January if possible

## 2020-12-09 ENCOUNTER — APPOINTMENT (OUTPATIENT)
Dept: URBAN - METROPOLITAN AREA CLINIC 254 | Age: 60
Setting detail: DERMATOLOGY
End: 2020-12-15

## 2020-12-09 VITALS — RESPIRATION RATE: 17 BRPM | WEIGHT: 283 LBS | HEIGHT: 64 IN

## 2020-12-09 DIAGNOSIS — L30.8 OTHER SPECIFIED DERMATITIS: ICD-10-CM

## 2020-12-09 DIAGNOSIS — L21.8 OTHER SEBORRHEIC DERMATITIS: ICD-10-CM

## 2020-12-09 DIAGNOSIS — L71.0 PERIORAL DERMATITIS: ICD-10-CM

## 2020-12-09 PROBLEM — C44.321 SQUAMOUS CELL CARCINOMA OF SKIN OF NOSE: Status: ACTIVE | Noted: 2020-12-09

## 2020-12-09 PROCEDURE — OTHER BIOPSY BY SHAVE METHOD: OTHER

## 2020-12-09 PROCEDURE — 99213 OFFICE O/P EST LOW 20 MIN: CPT | Mod: 25

## 2020-12-09 PROCEDURE — OTHER PRESCRIPTION: OTHER

## 2020-12-09 PROCEDURE — OTHER PATHOLOGY BILLING: OTHER

## 2020-12-09 PROCEDURE — OTHER COUNSELING: OTHER

## 2020-12-09 PROCEDURE — 88305 TISSUE EXAM BY PATHOLOGIST: CPT

## 2020-12-09 PROCEDURE — 11102 TANGNTL BX SKIN SINGLE LES: CPT

## 2020-12-09 RX ORDER — METRONIDAZOLE 7.5 MG/G
0.75% CREAM TOPICAL QD
Qty: 1 | Refills: 2 | Status: ERX | COMMUNITY
Start: 2020-12-09

## 2020-12-09 ASSESSMENT — LOCATION DETAILED DESCRIPTION DERM: LOCATION DETAILED: RIGHT MEDIAL FRONTAL SCALP

## 2020-12-09 ASSESSMENT — LOCATION SIMPLE DESCRIPTION DERM: LOCATION SIMPLE: RIGHT SCALP

## 2020-12-09 ASSESSMENT — LOCATION ZONE DERM: LOCATION ZONE: SCALP

## 2020-12-09 NOTE — PROCEDURE: BIOPSY BY SHAVE METHOD
X Size Of Lesion In Cm: 0
Type Of Destruction Used: Electrodesiccation
Post-Care Instructions: WOUND CARE:\\nDo NOT submerge wound in a bath, swimming pool, or hot tub for at least 1 week or for as long as there is an open wound. Gently cleanse the site daily with mild soap and water. Be careful NOT to allow a forceful stream of water to hit the biopsy site. After cleaning/showering, apply a thin layer of petrolatum ointment or Aquaphor in the wound followed by an adhesive bandage. Continue this process daily until healed. \\n\\nBLEEDING:\\nIf you develop persistent bleeding, apply firm and steady pressure over the dressing with gauze for 15 minutes. If bleeding persists, reapply pressure with an ice pack over the gauze for 15 minutes. NEVER APPLY ICE DIRECTLY TO THE SKIN. Do NOT peek under the gauze during these 15 minutes of pressure.  Call our office at [phone] if you cannot get the bleeding to stop. \\n\\nINFECTION:\\nSigns of infection may include increasing rather than decreasing pain (after the first few days), increasing redness/swelling/heat, draining pus, pink/red streaks around the wound, and fever or chills.  Please call our office immediately at [phone] if infection is suspected. It is normal to have some mild redness on or around the wound edges; this will lighten day by day and will become less tender.\\n\\nPAIN:\\nPain is usually minimal, but if needed you may take acetaminophen (Tylenol) every four hours as needed. Applying an ice pack over the dressing for 15-20 minutes every 2-3 hours will relieve swelling, lessen pain, and help minimize bruising. NEVER APPLY ICE DIRECTLY TO THE SKIN. Avoid ibuprofen (Advil, Motrin) and naproxen (Aleve) for the first 48 hours as these can increase bleeding.\\n\\nSWELLIG AND BRUISING:\\nSwelling and bruising are common and temporary, usually lasting 1 - 2 weeks. It is more common in areas treated around the eyes, hands, and feet. In the days following the procedure, swelling and bruising can be minimized by keeping the affected areas elevated when possible, reducing salty foods, and applying ice packs over the dressing for 15-20 minutes every 2-3 hours. NEVER APPLY ICE DIRECTLY TO THE SKIN.\\n\\nITCHING:\\nItchiness on and around the wound is very common and can last several days to weeks after surgery. Mild itch is normal as the wound is healing. \\n\\nNERVE CHANGES:\\nNumbness is usually temporary, but it may last for several weeks to months. You may also experience sharp pains at the wound sight as it heals.  Mild pain is normal and will gradually improve with time.\\n \\nNO SMOKING:\\nSmoking will delay the healing process. If you smoke, we recommend trying to quit or at minimum reduce how much you smoke following a procedure.
Silver Nitrate Text: The wound bed was treated with silver nitrate after the biopsy was performed.
Dressing: bandage
Destruction After The Procedure: No
Curettage Text: The wound bed was treated with curettage after the biopsy was performed.
Biopsy Type: H and E
Notification Instructions: - It can take up to 2 weeks to get a biopsy result. \\n- Please refrain from calling to request results until after 2 weeks.
Hemostasis: Aluminum Chloride
Consent: - Verbal and written consent was obtained and risks were reviewed prior to procedure today. \\n- Risks discussed include but are not limited to scarring, infection, bleeding, scabbing, incomplete removal, nerve damage, pain, and allergy to anesthesia.
Billing Type: Client Bill
Detail Level: Detailed
Cryotherapy Text: The wound bed was treated with cryotherapy after the biopsy was performed.
Biopsy Method: Dermablade
Depth Of Biopsy: dermis
Wound Care: Petrolatum
Was A Bandage Applied: Yes
Anesthesia Volume In Cc (Will Not Render If 0): 0.5
Electrodesiccation And Curettage Text: The wound bed was treated with electrodesiccation and curettage after the biopsy was performed.
Anesthesia Type: 2% lidocaine with epinephrine
Electrodesiccation Text: The wound bed was treated with electrodesiccation after the biopsy was performed.

## 2020-12-09 NOTE — PROCEDURE: COUNSELING
Detail Level: Detailed
Detail Level: Zone
Patient Specific Counseling (Will Not Stick From Patient To Patient): -Start metronidazole cream.
Patient Specific Counseling (Will Not Stick From Patient To Patient): -Doxepin has potentially severe interactions with patient medication.\\n-Patient says she does not want to take gabapentin.\\n-May try Zyrtec, but it is unknown if this will help at all.
Patient Specific Counseling (Will Not Stick From Patient To Patient): - Advised that this lesion has features that suggest a skin cancer so a biopsy is necessary to confirm the diagnosis and guide treatment.\\n- Patient expressed understanding.\\n- If skin cancer is confirmed, further treatment will be necessary.
Patient Specific Counseling (Will Not Stick From Patient To Patient): -Patient has been using betamethasone lotion every other day, in addition to ketoconazole. Patient scalp is clear but she still reports itching. This may be neuroderm.

## 2020-12-09 NOTE — PROCEDURE: PATHOLOGY BILLING
Rendering Text In Billing: The slides will be read by [employer] and reported in the attached document. Rendering Text In Billing: The slides will be read by [employer] and reported in the attached document.

## 2020-12-09 NOTE — PROCEDURE: PATHOLOGY BILLING
Immunohistochemistry ([id number] and [id number]) billing is not performed here. Please use the Immunohistochemistry Stain Billing plan to accomplish this. Immunohistochemistry ([id number] and [id number]) billing is not performed here. Please use the Immunohistochemistry Stain Billing plan to accomplish this.

## 2020-12-22 ENCOUNTER — APPOINTMENT (OUTPATIENT)
Dept: URBAN - METROPOLITAN AREA CLINIC 255 | Age: 60
Setting detail: DERMATOLOGY
End: 2020-12-27

## 2020-12-22 PROBLEM — C44.311 BASAL CELL CARCINOMA OF SKIN OF NOSE: Status: ACTIVE | Noted: 2020-12-22

## 2020-12-22 PROCEDURE — OTHER MIPS QUALITY: OTHER

## 2020-12-22 PROCEDURE — 14060 TIS TRNFR E/N/E/L 10 SQ CM/<: CPT

## 2020-12-22 PROCEDURE — 17311 MOHS 1 STAGE H/N/HF/G: CPT

## 2020-12-22 PROCEDURE — OTHER MOHS SURGERY: OTHER

## 2020-12-22 NOTE — PROCEDURE: MIPS QUALITY
Detail Level: Detailed
Quality 143: Oncology: Medical And Radiation- Pain Intensity Quantified: Pain severity quantified, no pain present
Quality 431: Preventive Care And Screening: Unhealthy Alcohol Use - Screening: Patient screened for unhealthy alcohol use using a single question and scores less than 2 times per year
Quality 130: Documentation Of Current Medications In The Medical Record: Current Medications Documented
Quality 110: Preventive Care And Screening: Influenza Immunization: Influenza Immunization Administered during Influenza season
Quality 226: Preventive Care And Screening: Tobacco Use: Screening And Cessation Intervention: Patient screened for tobacco use and is an ex/non-smoker

## 2020-12-22 NOTE — PROCEDURE: MOHS SURGERY
Physical Therapy Cancellation Note    Pt off floor at IR for procedure  Will follow up as appropriate       Freddie Guillen, LETITIA [facility] Complex Repair Preamble Text (Leave Blank If You Do Not Want): Extensive wide undermining was performed.

## 2020-12-22 NOTE — PROCEDURE: MOHS SURGERY
Reason for Call:  Medication or medication refill:    Do you use a [facility]?  Name of the pharmacy and phone number for the current request:  [facility], pended    Name of the medication requested: metFORMIN (GLUCOPHAGE) 500 MG tablet    Other request: pharmacy calling to clarify directions.  Patient states that he was told to take 2 tab daily.  If so, please send an updated script into pharmacy.  Call with any questions.    Can we leave a detailed message on this number? Not Applicable    Phone number patient can be reached at: Other phone number:  [phone]    Best Time: any    Call taken on 12/28/2017 at 11:27 AM by Kanika Das       Bcc Histology Text: There were numerous aggregates of basaloid cells.

## 2020-12-29 ENCOUNTER — APPOINTMENT (OUTPATIENT)
Dept: URBAN - METROPOLITAN AREA CLINIC 255 | Age: 60
Setting detail: DERMATOLOGY
End: 2020-12-30

## 2020-12-29 DIAGNOSIS — Z48.02 ENCOUNTER FOR REMOVAL OF SUTURES: ICD-10-CM

## 2020-12-29 PROCEDURE — OTHER DIAGNOSIS COMMENT: OTHER

## 2020-12-29 PROCEDURE — OTHER RETURN TO REFERRING PROVIDER: OTHER

## 2020-12-29 PROCEDURE — OTHER COUNSELING: OTHER

## 2020-12-29 PROCEDURE — OTHER SUTURE REMOVAL (GLOBAL PERIOD): OTHER

## 2020-12-29 PROCEDURE — OTHER MIPS QUALITY: OTHER

## 2020-12-29 ASSESSMENT — LOCATION ZONE DERM: LOCATION ZONE: NOSE

## 2020-12-29 ASSESSMENT — LOCATION DETAILED DESCRIPTION DERM: LOCATION DETAILED: NASAL DORSUM

## 2020-12-29 ASSESSMENT — LOCATION SIMPLE DESCRIPTION DERM: LOCATION SIMPLE: NOSE

## 2020-12-29 NOTE — PROCEDURE: DIAGNOSIS COMMENT
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
Detail Level: Simple

## 2020-12-29 NOTE — PROCEDURE: SUTURE REMOVAL (GLOBAL PERIOD)
Body Location Override (Optional - Billing Will Still Be Based On Selected Body Map Location If Applicable): Nasal bridge
Detail Level: Detailed
Add 50837 Cpt? (Important Note: In 2017 The Use Of 44342 Is Being Tracked By Cms To Determine Future Global Period Reimbursement For Global Periods): no

## 2021-01-14 ENCOUNTER — HEALTH MAINTENANCE LETTER (OUTPATIENT)
Age: 61
End: 2021-01-14

## 2021-04-07 ENCOUNTER — APPOINTMENT (OUTPATIENT)
Dept: URBAN - METROPOLITAN AREA CLINIC 254 | Age: 61
Setting detail: DERMATOLOGY
End: 2021-04-08

## 2021-04-07 VITALS — RESPIRATION RATE: 14 BRPM | HEIGHT: 64 IN | WEIGHT: 293 LBS

## 2021-04-07 DIAGNOSIS — L81.4 OTHER MELANIN HYPERPIGMENTATION: ICD-10-CM

## 2021-04-07 DIAGNOSIS — D18.0 HEMANGIOMA: ICD-10-CM

## 2021-04-07 DIAGNOSIS — L82.1 OTHER SEBORRHEIC KERATOSIS: ICD-10-CM

## 2021-04-07 DIAGNOSIS — D22 MELANOCYTIC NEVI: ICD-10-CM

## 2021-04-07 DIAGNOSIS — Z85.828 PERSONAL HISTORY OF OTHER MALIGNANT NEOPLASM OF SKIN: ICD-10-CM

## 2021-04-07 DIAGNOSIS — Z71.89 OTHER SPECIFIED COUNSELING: ICD-10-CM

## 2021-04-07 PROBLEM — D48.5 NEOPLASM OF UNCERTAIN BEHAVIOR OF SKIN: Status: ACTIVE | Noted: 2021-04-07

## 2021-04-07 PROBLEM — D22.5 MELANOCYTIC NEVI OF TRUNK: Status: ACTIVE | Noted: 2021-04-07

## 2021-04-07 PROBLEM — D18.01 HEMANGIOMA OF SKIN AND SUBCUTANEOUS TISSUE: Status: ACTIVE | Noted: 2021-04-07

## 2021-04-07 PROCEDURE — OTHER ADDITIONAL NOTES: OTHER

## 2021-04-07 PROCEDURE — 99213 OFFICE O/P EST LOW 20 MIN: CPT

## 2021-04-07 PROCEDURE — OTHER COUNSELING: OTHER

## 2021-04-07 ASSESSMENT — LOCATION SIMPLE DESCRIPTION DERM
LOCATION SIMPLE: CHEST
LOCATION SIMPLE: UPPER BACK
LOCATION SIMPLE: NOSE
LOCATION SIMPLE: RIGHT FOREHEAD

## 2021-04-07 ASSESSMENT — LOCATION DETAILED DESCRIPTION DERM
LOCATION DETAILED: INFERIOR THORACIC SPINE
LOCATION DETAILED: LEFT MEDIAL SUPERIOR CHEST
LOCATION DETAILED: NASAL DORSUM
LOCATION DETAILED: RIGHT INFERIOR MEDIAL FOREHEAD

## 2021-04-07 ASSESSMENT — LOCATION ZONE DERM
LOCATION ZONE: FACE
LOCATION ZONE: NOSE
LOCATION ZONE: TRUNK

## 2021-04-07 NOTE — PROCEDURE: ADDITIONAL NOTES
Additional Notes: Will continue to monitor lesion and consider biopsy with any changes.
Detail Level: Simple
Render Risk Assessment In Note?: no

## 2021-04-28 ENCOUNTER — APPOINTMENT (OUTPATIENT)
Dept: URBAN - METROPOLITAN AREA CLINIC 254 | Age: 61
Setting detail: DERMATOLOGY
End: 2021-04-29

## 2021-04-28 VITALS — RESPIRATION RATE: 14 BRPM | WEIGHT: 293 LBS | HEIGHT: 64 IN

## 2021-04-28 DIAGNOSIS — L92.3 FOREIGN BODY GRANULOMA OF THE SKIN AND SUBCUTANEOUS TISSUE: ICD-10-CM

## 2021-04-28 DIAGNOSIS — L82.0 INFLAMED SEBORRHEIC KERATOSIS: ICD-10-CM

## 2021-04-28 PROCEDURE — 99212 OFFICE O/P EST SF 10 MIN: CPT | Mod: 25

## 2021-04-28 PROCEDURE — OTHER LIQUID NITROGEN: OTHER

## 2021-04-28 PROCEDURE — OTHER COUNSELING: OTHER

## 2021-04-28 PROCEDURE — 17110 DESTRUCT B9 LESION 1-14: CPT

## 2021-04-28 ASSESSMENT — LOCATION DETAILED DESCRIPTION DERM
LOCATION DETAILED: RIGHT MEDIAL FOREHEAD
LOCATION DETAILED: LEFT LATERAL FOREHEAD
LOCATION DETAILED: RIGHT INFERIOR FOREHEAD
LOCATION DETAILED: RIGHT INFERIOR MEDIAL FOREHEAD
LOCATION DETAILED: NASAL ROOT
LOCATION DETAILED: RIGHT INFERIOR LATERAL FOREHEAD
LOCATION DETAILED: RIGHT FOREHEAD

## 2021-04-28 ASSESSMENT — LOCATION ZONE DERM
LOCATION ZONE: FACE
LOCATION ZONE: NOSE

## 2021-04-28 ASSESSMENT — LOCATION SIMPLE DESCRIPTION DERM
LOCATION SIMPLE: NOSE
LOCATION SIMPLE: RIGHT FOREHEAD
LOCATION SIMPLE: LEFT FOREHEAD

## 2021-04-28 NOTE — PROCEDURE: LIQUID NITROGEN
Post-Care Instructions: - Avoid picking at any of the treated lesions.
Render Note In Bullet Format When Appropriate: Yes
Detail Level: Detailed
Include Z78.9 (Other Specified Conditions Influencing Health Status) As An Associated Diagnosis?: No
Consent: - Verbal and written consent was obtained, and risks were reviewed prior to procedure today. \\n- Risks discussed include but are not limited to pain, crusting, scabbing, blistering, scarring, temporary or permanent darker or lighter pigmentary change, recurrence, incomplete resolution, and infection.
Medical Necessity Information: It is in your best interest to select a reason for this procedure from the list below. All of these items fulfill various CMS LCD requirements except the new and changing color options.
Medical Necessity Clause: This procedure was medically necessary because the lesions that were treated were:

## 2021-04-28 NOTE — HPI: BUMPS
How Severe Are Your Bumps?: mild
Have Your Bumps Been Treated?: not been treated
Is This A New Presentation, Or A Follow-Up?: Bumps
Additional History: She also has concerns about a red bump that has recently formed about a week ago...along the Mohs scar on her nose

## 2021-05-24 NOTE — PROCEDURE: MOHS SURGERY
coffee Suturegard Body: The suture ends were repeatedly re-tightened and re-clamped to achieve the desired tissue expansion.

## 2021-07-15 ENCOUNTER — APPOINTMENT (OUTPATIENT)
Dept: URBAN - METROPOLITAN AREA CLINIC 254 | Age: 61
Setting detail: DERMATOLOGY
End: 2021-07-16

## 2021-07-15 VITALS — HEIGHT: 64 IN | WEIGHT: 293 LBS | RESPIRATION RATE: 16 BRPM

## 2021-07-15 DIAGNOSIS — Z85.828 PERSONAL HISTORY OF OTHER MALIGNANT NEOPLASM OF SKIN: ICD-10-CM

## 2021-07-15 DIAGNOSIS — L81.4 OTHER MELANIN HYPERPIGMENTATION: ICD-10-CM

## 2021-07-15 DIAGNOSIS — D18.0 HEMANGIOMA: ICD-10-CM

## 2021-07-15 DIAGNOSIS — L57.0 ACTINIC KERATOSIS: ICD-10-CM

## 2021-07-15 DIAGNOSIS — L82.1 OTHER SEBORRHEIC KERATOSIS: ICD-10-CM

## 2021-07-15 DIAGNOSIS — L92.3 FOREIGN BODY GRANULOMA OF THE SKIN AND SUBCUTANEOUS TISSUE: ICD-10-CM

## 2021-07-15 DIAGNOSIS — D22 MELANOCYTIC NEVI: ICD-10-CM

## 2021-07-15 DIAGNOSIS — Z71.89 OTHER SPECIFIED COUNSELING: ICD-10-CM

## 2021-07-15 PROBLEM — D18.01 HEMANGIOMA OF SKIN AND SUBCUTANEOUS TISSUE: Status: ACTIVE | Noted: 2021-07-15

## 2021-07-15 PROBLEM — D22.5 MELANOCYTIC NEVI OF TRUNK: Status: ACTIVE | Noted: 2021-07-15

## 2021-07-15 PROCEDURE — OTHER ADDITIONAL NOTES: OTHER

## 2021-07-15 PROCEDURE — OTHER COUNSELING: OTHER

## 2021-07-15 PROCEDURE — OTHER SUNSCREEN RECOMMENDATIONS: OTHER

## 2021-07-15 PROCEDURE — 17003 DESTRUCT PREMALG LES 2-14: CPT

## 2021-07-15 PROCEDURE — 17000 DESTRUCT PREMALG LESION: CPT

## 2021-07-15 PROCEDURE — OTHER MIPS QUALITY: OTHER

## 2021-07-15 PROCEDURE — 99213 OFFICE O/P EST LOW 20 MIN: CPT | Mod: 25

## 2021-07-15 PROCEDURE — OTHER LIQUID NITROGEN: OTHER

## 2021-07-15 ASSESSMENT — LOCATION ZONE DERM
LOCATION ZONE: TRUNK
LOCATION ZONE: NOSE
LOCATION ZONE: FACE
LOCATION ZONE: LIP

## 2021-07-15 ASSESSMENT — LOCATION DETAILED DESCRIPTION DERM
LOCATION DETAILED: SUPERIOR THORACIC SPINE
LOCATION DETAILED: EPIGASTRIC SKIN
LOCATION DETAILED: LEFT MEDIAL EYEBROW
LOCATION DETAILED: UPPER STERNUM
LOCATION DETAILED: RIGHT RIB CAGE
LOCATION DETAILED: NASAL ROOT
LOCATION DETAILED: RIGHT MEDIAL UPPER BACK
LOCATION DETAILED: RIGHT SUPERIOR VERMILION BORDER
LOCATION DETAILED: RIGHT MEDIAL SUPERIOR CHEST

## 2021-07-15 ASSESSMENT — LOCATION SIMPLE DESCRIPTION DERM
LOCATION SIMPLE: RIGHT UPPER BACK
LOCATION SIMPLE: LEFT EYEBROW
LOCATION SIMPLE: RIGHT UPPER LIP
LOCATION SIMPLE: NOSE
LOCATION SIMPLE: CHEST
LOCATION SIMPLE: UPPER BACK
LOCATION SIMPLE: ABDOMEN

## 2021-07-15 NOTE — PROCEDURE: ADDITIONAL NOTES
Render Risk Assessment In Note?: no
Additional Notes: If still present in six months, shave biopsy is recommended.
Detail Level: Simple

## 2021-07-15 NOTE — PROCEDURE: LIQUID NITROGEN
Show Applicator Variable?: Yes
Render Post-Care Instructions In Note?: no
Duration Of Freeze Thaw-Cycle (Seconds): 0
Detail Level: Detailed
Post-Care Instructions: I reviewed with the patient in detail post-care instructions. Patient is to wear sunprotection, and avoid picking at any of the treated lesions. Pt may apply Vaseline to crusted or scabbing areas.
Consent: The patient's consent was obtained including but not limited to risks of crusting, scabbing, blistering, scarring, darker or lighter pigmentary change, recurrence, incomplete removal and infection.

## 2021-08-17 ENCOUNTER — TELEPHONE (OUTPATIENT)
Dept: ORTHOPEDICS | Facility: CLINIC | Age: 61
End: 2021-08-17

## 2021-08-18 NOTE — TELEPHONE ENCOUNTER
CLIFF LVM informing her that if she is not having any pain or concerns regarding her bilateral shoulders, she does not need to follow up with Dr. Marx.    Provided return call number if she has questions or would like to schedule an appointment.    CLIFF Cowan  
[facility] Call Center    Phone Message    May a detailed message be left on voicemail: yes     Reason for Call: Other: This pt called to ask if she was due for a follow-up visit with Dr. Carson. This pt had shoulder surgery 5 years ago + the previous shoulder done 10 years ago. The pt is wondering if she is due for a visit. The pt stated that there are no issues or htings or concern, but with the 1st shoulder she followed-up at 5 years. Please have someone on Dr. Carson's care team reach back out to this pt. Ok'd to leave a detailed  message.  [phone]    Action Taken: Message Sent: [facility]: Ortho    Travel Screening: Not Applicable                                                                      
normal

## 2021-09-28 NOTE — PROCEDURE: MOHS SURGERY
I have reviewed the notes, assessments, and/or procedures performed by the [facility] and agree with their documentation of the patient's care in the ED.       Tara House, DO  09/28/21 0631     Complex Repair And Flap Additional Text (Will Appearing After The Standard Complex Repair Text): The complex repair was not sufficient to completely close the primary defect. The remaining additional defect was repaired with the flap mentioned below.

## 2021-10-23 ENCOUNTER — HEALTH MAINTENANCE LETTER (OUTPATIENT)
Age: 61
End: 2021-10-23

## 2021-10-28 ENCOUNTER — APPOINTMENT (OUTPATIENT)
Dept: URBAN - METROPOLITAN AREA CLINIC 254 | Age: 61
Setting detail: DERMATOLOGY
End: 2021-10-31

## 2021-10-28 VITALS — WEIGHT: 293 LBS | RESPIRATION RATE: 17 BRPM | HEIGHT: 64 IN

## 2021-10-28 DIAGNOSIS — L65.0 TELOGEN EFFLUVIUM: ICD-10-CM

## 2021-10-28 DIAGNOSIS — B07.8 OTHER VIRAL WARTS: ICD-10-CM

## 2021-10-28 DIAGNOSIS — Z85.828 PERSONAL HISTORY OF OTHER MALIGNANT NEOPLASM OF SKIN: ICD-10-CM

## 2021-10-28 PROCEDURE — 99213 OFFICE O/P EST LOW 20 MIN: CPT

## 2021-10-28 PROCEDURE — OTHER COUNSELING: OTHER

## 2021-10-28 ASSESSMENT — LOCATION SIMPLE DESCRIPTION DERM
LOCATION SIMPLE: NOSE
LOCATION SIMPLE: LEFT SCALP

## 2021-10-28 ASSESSMENT — LOCATION DETAILED DESCRIPTION DERM
LOCATION DETAILED: LEFT MEDIAL FRONTAL SCALP
LOCATION DETAILED: NASAL DORSUM

## 2021-10-28 ASSESSMENT — LOCATION ZONE DERM
LOCATION ZONE: NOSE
LOCATION ZONE: SCALP

## 2021-10-28 NOTE — PROCEDURE: COUNSELING
Detail Level: Detailed
Patient Specific Counseling (Will Not Stick From Patient To Patient): -Gave patient handout regarding Niacinamide.

## 2021-11-30 ENCOUNTER — APPOINTMENT (OUTPATIENT)
Dept: URBAN - METROPOLITAN AREA CLINIC 254 | Age: 61
Setting detail: DERMATOLOGY
End: 2021-12-04

## 2021-11-30 VITALS — WEIGHT: 199 LBS | HEIGHT: 64 IN

## 2021-11-30 VITALS — HEIGHT: 64 IN | WEIGHT: 199 LBS

## 2021-11-30 DIAGNOSIS — L92.3 FOREIGN BODY GRANULOMA OF THE SKIN AND SUBCUTANEOUS TISSUE: ICD-10-CM

## 2021-11-30 DIAGNOSIS — B07.8 OTHER VIRAL WARTS: ICD-10-CM

## 2021-11-30 DIAGNOSIS — L90.5 SCAR CONDITIONS AND FIBROSIS OF SKIN: ICD-10-CM

## 2021-11-30 DIAGNOSIS — L82.1 OTHER SEBORRHEIC KERATOSIS: ICD-10-CM

## 2021-11-30 DIAGNOSIS — L20.89 OTHER ATOPIC DERMATITIS: ICD-10-CM

## 2021-11-30 DIAGNOSIS — D22 MELANOCYTIC NEVI: ICD-10-CM

## 2021-11-30 DIAGNOSIS — L82.0 INFLAMED SEBORRHEIC KERATOSIS: ICD-10-CM

## 2021-11-30 DIAGNOSIS — Z71.89 OTHER SPECIFIED COUNSELING: ICD-10-CM

## 2021-11-30 DIAGNOSIS — L57.0 ACTINIC KERATOSIS: ICD-10-CM

## 2021-11-30 DIAGNOSIS — L81.4 OTHER MELANIN HYPERPIGMENTATION: ICD-10-CM

## 2021-11-30 PROBLEM — L20.84 INTRINSIC (ALLERGIC) ECZEMA: Status: ACTIVE | Noted: 2021-11-30

## 2021-11-30 PROBLEM — D22.5 MELANOCYTIC NEVI OF TRUNK: Status: ACTIVE | Noted: 2021-11-30

## 2021-11-30 PROCEDURE — OTHER LIQUID NITROGEN: OTHER

## 2021-11-30 PROCEDURE — 17110 DESTRUCT B9 LESION 1-14: CPT

## 2021-11-30 PROCEDURE — 17000 DESTRUCT PREMALG LESION: CPT | Mod: 59

## 2021-11-30 PROCEDURE — 99213 OFFICE O/P EST LOW 20 MIN: CPT | Mod: 25

## 2021-11-30 PROCEDURE — OTHER PHOTO-DOCUMENTATION: OTHER

## 2021-11-30 PROCEDURE — OTHER COUNSELING: OTHER

## 2021-11-30 PROCEDURE — OTHER MONITORING: OTHER

## 2021-11-30 PROCEDURE — OTHER ADDITIONAL NOTES: OTHER

## 2021-11-30 ASSESSMENT — LOCATION DETAILED DESCRIPTION DERM
LOCATION DETAILED: PERIUMBILICAL SKIN
LOCATION DETAILED: LEFT RIB CAGE
LOCATION DETAILED: RIGHT INDEX FINGERTIP
LOCATION DETAILED: RIGHT RIB CAGE
LOCATION DETAILED: RIGHT POSTERIOR SHOULDER
LOCATION DETAILED: RIGHT BUTTOCK
LOCATION DETAILED: NASAL DORSUM
LOCATION DETAILED: UPPER STERNUM
LOCATION DETAILED: RIGHT INFERIOR LATERAL FOREHEAD
LOCATION DETAILED: RIGHT CENTRAL EYEBROW
LOCATION DETAILED: LEFT CENTRAL MALAR CHEEK
LOCATION DETAILED: RIGHT LATERAL EYEBROW
LOCATION DETAILED: RIGHT DORSAL FOOT
LOCATION DETAILED: LEFT LATERAL SUPERIOR CHEST
LOCATION DETAILED: LEFT POSTERIOR SHOULDER
LOCATION DETAILED: RIGHT CENTRAL MALAR CHEEK
LOCATION DETAILED: LEFT PROXIMAL PRETIBIAL REGION
LOCATION DETAILED: LEFT SUPERIOR TEMPLE
LOCATION DETAILED: LEFT ELBOW
LOCATION DETAILED: SUPERIOR THORACIC SPINE
LOCATION DETAILED: LEFT LATERAL MALAR CHEEK

## 2021-11-30 ASSESSMENT — LOCATION SIMPLE DESCRIPTION DERM
LOCATION SIMPLE: LEFT PRETIBIAL REGION
LOCATION SIMPLE: RIGHT SHOULDER
LOCATION SIMPLE: UPPER BACK
LOCATION SIMPLE: LEFT TEMPLE
LOCATION SIMPLE: RIGHT FOREHEAD
LOCATION SIMPLE: RIGHT CHEEK
LOCATION SIMPLE: RIGHT BUTTOCK
LOCATION SIMPLE: RIGHT EYEBROW
LOCATION SIMPLE: LEFT ELBOW
LOCATION SIMPLE: NOSE
LOCATION SIMPLE: CHEST
LOCATION SIMPLE: RIGHT FOOT
LOCATION SIMPLE: LEFT CHEEK
LOCATION SIMPLE: RIGHT INDEX FINGER
LOCATION SIMPLE: LEFT SHOULDER
LOCATION SIMPLE: ABDOMEN

## 2021-11-30 ASSESSMENT — LOCATION ZONE DERM
LOCATION ZONE: FACE
LOCATION ZONE: FINGER
LOCATION ZONE: ARM
LOCATION ZONE: LEG
LOCATION ZONE: FEET
LOCATION ZONE: TRUNK
LOCATION ZONE: NOSE

## 2021-11-30 NOTE — HPI: FULL BODY SKIN EXAMINATION
What Is The Reason For Today's Visit?: Skin Lesion
What Is The Reason For Today's Visit? (Being Monitored For X): concerning skin lesions on an annual basis
What Is The Reason For Today's Visit?: Full Body Skin Examination
What Is The Reason For Today's Visit? (Being Monitored For X): the re-examination of lesions previously examined

## 2021-11-30 NOTE — PROCEDURE: LIQUID NITROGEN
Show Aperture Variable?: Yes
Medical Necessity Information: It is in your best interest to select a reason for this procedure from the list below. All of these items fulfill various CMS LCD requirements except the new and changing color options.
Detail Level: Detailed
Add 52 Modifier (Optional): no
Post-Care Instructions: I reviewed with the patient in detail post-care instructions. Patient is to wear sunprotection, and avoid picking at any of the treated lesions. Pt may apply Vaseline to crusted or scabbing areas.
Medical Necessity Clause: This procedure was medically necessary because the lesions that were treated were:
Consent: The patient's consent was obtained including but not limited to risks of crusting, scabbing, blistering, scarring, darker or lighter pigmentary change, recurrence, incomplete removal and infection.
Duration Of Freeze Thaw-Cycle (Seconds): 0

## 2021-11-30 NOTE — PROCEDURE: ADDITIONAL NOTES
Additional Notes: -Second treatment.\\n-If persists, recommend either Canthacur and/or Efudex.
Render Risk Assessment In Note?: no
Detail Level: Detailed

## 2021-11-30 NOTE — PROCEDURE: MONITORING
Patient Specific Counseling (Will Not Stick From Patient To Patient): -Consider biopsy if persists
Detail Level: Detailed

## 2021-12-09 NOTE — PROCEDURE: MOHS SURGERY
Cerebral Edema/Brain Compression / Herniation Ear Wedge Repair Text: A wedge excision was completed by carrying down an excision through the full thickness of the ear and cartilage with an inward facing Burow's triangle. The wound was then closed in a layered fashion.

## 2021-12-28 ENCOUNTER — APPOINTMENT (OUTPATIENT)
Dept: URBAN - METROPOLITAN AREA CLINIC 254 | Age: 61
Setting detail: DERMATOLOGY
End: 2021-12-30

## 2021-12-28 DIAGNOSIS — L259 CONTACT DERMATITIS AND OTHER ECZEMA, UNSPECIFIED CAUSE: ICD-10-CM

## 2021-12-28 DIAGNOSIS — B07.8 OTHER VIRAL WARTS: ICD-10-CM

## 2021-12-28 PROBLEM — L23.9 ALLERGIC CONTACT DERMATITIS, UNSPECIFIED CAUSE: Status: ACTIVE | Noted: 2021-12-28

## 2021-12-28 PROCEDURE — OTHER MEDICATION COUNSELING: OTHER

## 2021-12-28 PROCEDURE — OTHER COUNSELING: OTHER

## 2021-12-28 PROCEDURE — OTHER PRESCRIPTION: OTHER

## 2021-12-28 PROCEDURE — 99213 OFFICE O/P EST LOW 20 MIN: CPT

## 2021-12-28 PROCEDURE — OTHER MIPS QUALITY: OTHER

## 2021-12-28 RX ORDER — TRIAMCINOLONE ACETONIDE 1 MG/G
BID CREAM TOPICAL BID
Qty: 30 | Refills: 0 | Status: ERX | COMMUNITY
Start: 2021-12-28

## 2021-12-28 ASSESSMENT — LOCATION SIMPLE DESCRIPTION DERM
LOCATION SIMPLE: RIGHT INDEX FINGER
LOCATION SIMPLE: RIGHT FOOT

## 2021-12-28 ASSESSMENT — LOCATION ZONE DERM
LOCATION ZONE: FINGER
LOCATION ZONE: FEET

## 2021-12-28 ASSESSMENT — LOCATION DETAILED DESCRIPTION DERM
LOCATION DETAILED: RIGHT DORSAL FOOT
LOCATION DETAILED: RIGHT INDEX FINGERTIP

## 2021-12-28 NOTE — PROCEDURE: COUNSELING
Patient Specific Counseling (Will Not Stick From Patient To Patient): Photo taken today to check at next ov to ensure AK has fully disappeared
Detail Level: Detailed

## 2022-02-12 ENCOUNTER — HEALTH MAINTENANCE LETTER (OUTPATIENT)
Age: 62
End: 2022-02-12

## 2022-02-14 DIAGNOSIS — Z11.59 ENCOUNTER FOR SCREENING FOR OTHER VIRAL DISEASES: Primary | ICD-10-CM

## 2022-03-10 ENCOUNTER — LAB (OUTPATIENT)
Dept: LAB | Facility: CLINIC | Age: 62
End: 2022-03-10
Payer: COMMERCIAL

## 2022-03-10 DIAGNOSIS — Z11.59 ENCOUNTER FOR SCREENING FOR OTHER VIRAL DISEASES: ICD-10-CM

## 2022-03-10 LAB — SARS-COV-2 RNA RESP QL NAA+PROBE: NEGATIVE

## 2022-03-10 PROCEDURE — U0003 INFECTIOUS AGENT DETECTION BY NUCLEIC ACID (DNA OR RNA); SEVERE ACUTE RESPIRATORY SYNDROME CORONAVIRUS 2 (SARS-COV-2) (CORONAVIRUS DISEASE [COVID-19]), AMPLIFIED PROBE TECHNIQUE, MAKING USE OF HIGH THROUGHPUT TECHNOLOGIES AS DESCRIBED BY CMS-2020-01-R: HCPCS

## 2022-03-10 PROCEDURE — U0005 INFEC AGEN DETEC AMPLI PROBE: HCPCS

## 2022-03-10 RX ORDER — IBUPROFEN 200 MG
CAPSULE ORAL DAILY
COMMUNITY

## 2022-03-10 RX ORDER — COLCHICINE 0.6 MG/1
0.6 TABLET ORAL DAILY
COMMUNITY

## 2022-03-10 RX ORDER — SEMAGLUTIDE 1.34 MG/ML
INJECTION, SOLUTION SUBCUTANEOUS
COMMUNITY
End: 2024-03-19

## 2022-03-10 RX ORDER — GABAPENTIN 100 MG/1
100 CAPSULE ORAL 3 TIMES DAILY
COMMUNITY
End: 2024-03-19

## 2022-03-10 RX ORDER — OXYBUTYNIN CHLORIDE 10 MG/1
10 TABLET, EXTENDED RELEASE ORAL DAILY
COMMUNITY
End: 2024-03-19

## 2022-03-11 RX ORDER — SOLIFENACIN SUCCINATE 10 MG/1
10 TABLET, FILM COATED ORAL DAILY
COMMUNITY
End: 2024-03-19

## 2022-03-13 ENCOUNTER — ANESTHESIA EVENT (OUTPATIENT)
Dept: SURGERY | Facility: CLINIC | Age: 62
End: 2022-03-13
Payer: COMMERCIAL

## 2022-03-14 ENCOUNTER — HOSPITAL ENCOUNTER (OUTPATIENT)
Facility: CLINIC | Age: 62
Discharge: HOME | End: 2022-03-14
Attending: OPHTHALMOLOGY | Admitting: OPHTHALMOLOGY
Payer: COMMERCIAL

## 2022-03-14 ENCOUNTER — ANESTHESIA (OUTPATIENT)
Dept: SURGERY | Facility: CLINIC | Age: 62
End: 2022-03-14
Payer: COMMERCIAL

## 2022-03-14 VITALS
BODY MASS INDEX: 47.84 KG/M2 | OXYGEN SATURATION: 96 % | SYSTOLIC BLOOD PRESSURE: 133 MMHG | HEIGHT: 64 IN | RESPIRATION RATE: 16 BRPM | HEART RATE: 78 BPM | DIASTOLIC BLOOD PRESSURE: 78 MMHG | TEMPERATURE: 98.6 F | WEIGHT: 280.2 LBS

## 2022-03-14 DIAGNOSIS — H25.11 AGE-RELATED NUCLEAR CATARACT OF RIGHT EYE: Primary | ICD-10-CM

## 2022-03-14 LAB — GLUCOSE BLDC GLUCOMTR-MCNC: 77 MG/DL (ref 70–99)

## 2022-03-14 PROCEDURE — 370N000017 HC ANESTHESIA TECHNICAL FEE, PER MIN: Performed by: OPHTHALMOLOGY

## 2022-03-14 PROCEDURE — 710N000012 HC RECOVERY PHASE 2, PER MINUTE: Performed by: OPHTHALMOLOGY

## 2022-03-14 PROCEDURE — 272N000001 HC OR GENERAL SUPPLY STERILE: Performed by: OPHTHALMOLOGY

## 2022-03-14 PROCEDURE — 258N000003 HC RX IP 258 OP 636: Performed by: REGISTERED NURSE

## 2022-03-14 PROCEDURE — 250N000009 HC RX 250: Performed by: REGISTERED NURSE

## 2022-03-14 PROCEDURE — V2632 POST CHMBR INTRAOCULAR LENS: HCPCS | Performed by: OPHTHALMOLOGY

## 2022-03-14 PROCEDURE — 250N000011 HC RX IP 250 OP 636: Performed by: REGISTERED NURSE

## 2022-03-14 PROCEDURE — 250N000009 HC RX 250: Performed by: OPHTHALMOLOGY

## 2022-03-14 PROCEDURE — 82962 GLUCOSE BLOOD TEST: CPT

## 2022-03-14 PROCEDURE — 250N000011 HC RX IP 250 OP 636: Performed by: OPHTHALMOLOGY

## 2022-03-14 PROCEDURE — 999N000141 HC STATISTIC PRE-PROCEDURE NURSING ASSESSMENT: Performed by: OPHTHALMOLOGY

## 2022-03-14 PROCEDURE — 360N000076 HC SURGERY LEVEL 3, PER MIN: Performed by: OPHTHALMOLOGY

## 2022-03-14 DEVICE — EYE IMP IOL AMO PCL TECNIS ZCB00 10.5: Type: IMPLANTABLE DEVICE | Site: EYE | Status: FUNCTIONAL

## 2022-03-14 RX ORDER — ACETAMINOPHEN 325 MG/1
975 TABLET ORAL EVERY 6 HOURS PRN
Status: DISCONTINUED | OUTPATIENT
Start: 2022-03-14 | End: 2022-03-14 | Stop reason: HOSPADM

## 2022-03-14 RX ORDER — LABETALOL HYDROCHLORIDE 5 MG/ML
10 INJECTION, SOLUTION INTRAVENOUS
Status: DISCONTINUED | OUTPATIENT
Start: 2022-03-14 | End: 2022-03-14 | Stop reason: HOSPADM

## 2022-03-14 RX ORDER — SODIUM CHLORIDE, SODIUM LACTATE, POTASSIUM CHLORIDE, CALCIUM CHLORIDE 600; 310; 30; 20 MG/100ML; MG/100ML; MG/100ML; MG/100ML
INJECTION, SOLUTION INTRAVENOUS CONTINUOUS
Status: DISCONTINUED | OUTPATIENT
Start: 2022-03-14 | End: 2022-03-14 | Stop reason: HOSPADM

## 2022-03-14 RX ORDER — TIMOLOL MALEATE 5 MG/ML
SOLUTION/ DROPS OPHTHALMIC PRN
Status: DISCONTINUED | OUTPATIENT
Start: 2022-03-14 | End: 2022-03-14 | Stop reason: HOSPADM

## 2022-03-14 RX ORDER — ONDANSETRON 4 MG/1
4 TABLET, ORALLY DISINTEGRATING ORAL EVERY 30 MIN PRN
Status: DISCONTINUED | OUTPATIENT
Start: 2022-03-14 | End: 2022-03-14 | Stop reason: HOSPADM

## 2022-03-14 RX ORDER — LIDOCAINE HYDROCHLORIDE 10 MG/ML
INJECTION, SOLUTION EPIDURAL; INFILTRATION; INTRACAUDAL; PERINEURAL PRN
Status: DISCONTINUED | OUTPATIENT
Start: 2022-03-14 | End: 2022-03-14 | Stop reason: HOSPADM

## 2022-03-14 RX ORDER — PHENYLEPHRINE HYDROCHLORIDE 25 MG/ML
1 SOLUTION/ DROPS OPHTHALMIC
Status: COMPLETED | OUTPATIENT
Start: 2022-03-14 | End: 2022-03-14

## 2022-03-14 RX ORDER — MOXIFLOXACIN 5 MG/ML
1 SOLUTION/ DROPS OPHTHALMIC
Status: COMPLETED | OUTPATIENT
Start: 2022-03-14 | End: 2022-03-14

## 2022-03-14 RX ORDER — PROPARACAINE HYDROCHLORIDE 5 MG/ML
1 SOLUTION/ DROPS OPHTHALMIC ONCE
Status: COMPLETED | OUTPATIENT
Start: 2022-03-14 | End: 2022-03-14

## 2022-03-14 RX ORDER — PROPARACAINE HYDROCHLORIDE 5 MG/ML
1 SOLUTION/ DROPS OPHTHALMIC ONCE
Status: DISCONTINUED | OUTPATIENT
Start: 2022-03-14 | End: 2022-03-14 | Stop reason: HOSPADM

## 2022-03-14 RX ORDER — DORZOLAMIDE HYDROCHLORIDE AND TIMOLOL MALEATE 20; 5 MG/ML; MG/ML
1 SOLUTION/ DROPS OPHTHALMIC 2 TIMES DAILY
COMMUNITY

## 2022-03-14 RX ORDER — ONDANSETRON 2 MG/ML
4 INJECTION INTRAMUSCULAR; INTRAVENOUS EVERY 30 MIN PRN
Status: DISCONTINUED | OUTPATIENT
Start: 2022-03-14 | End: 2022-03-14 | Stop reason: HOSPADM

## 2022-03-14 RX ORDER — TETRACAINE HYDROCHLORIDE 5 MG/ML
SOLUTION OPHTHALMIC PRN
Status: DISCONTINUED | OUTPATIENT
Start: 2022-03-14 | End: 2022-03-14 | Stop reason: HOSPADM

## 2022-03-14 RX ORDER — SODIUM CHLORIDE, SODIUM LACTATE, POTASSIUM CHLORIDE, CALCIUM CHLORIDE 600; 310; 30; 20 MG/100ML; MG/100ML; MG/100ML; MG/100ML
INJECTION, SOLUTION INTRAVENOUS CONTINUOUS PRN
Status: DISCONTINUED | OUTPATIENT
Start: 2022-03-14 | End: 2022-03-14

## 2022-03-14 RX ORDER — TROPICAMIDE 10 MG/ML
1 SOLUTION/ DROPS OPHTHALMIC
Status: COMPLETED | OUTPATIENT
Start: 2022-03-14 | End: 2022-03-14

## 2022-03-14 RX ADMIN — PHENYLEPHRINE HYDROCHLORIDE 1 DROP: 25 SOLUTION/ DROPS OPHTHALMIC at 10:20

## 2022-03-14 RX ADMIN — TROPICAMIDE 1 DROP: 10 SOLUTION/ DROPS OPHTHALMIC at 10:00

## 2022-03-14 RX ADMIN — DEXMEDETOMIDINE HYDROCHLORIDE 12 MCG: 100 INJECTION, SOLUTION INTRAVENOUS at 12:40

## 2022-03-14 RX ADMIN — MOXIFLOXACIN 1 DROP: 5 SOLUTION/ DROPS OPHTHALMIC at 10:33

## 2022-03-14 RX ADMIN — PHENYLEPHRINE HYDROCHLORIDE 1 DROP: 25 SOLUTION/ DROPS OPHTHALMIC at 10:25

## 2022-03-14 RX ADMIN — TROPICAMIDE 1 DROP: 10 SOLUTION/ DROPS OPHTHALMIC at 10:10

## 2022-03-14 RX ADMIN — PROPARACAINE HYDROCHLORIDE 1 DROP: 5 SOLUTION/ DROPS OPHTHALMIC at 09:52

## 2022-03-14 RX ADMIN — MOXIFLOXACIN 1 DROP: 5 SOLUTION/ DROPS OPHTHALMIC at 10:38

## 2022-03-14 RX ADMIN — MIDAZOLAM 1 MG: 1 INJECTION INTRAMUSCULAR; INTRAVENOUS at 12:40

## 2022-03-14 RX ADMIN — DEXMEDETOMIDINE HYDROCHLORIDE 8 MCG: 100 INJECTION, SOLUTION INTRAVENOUS at 12:55

## 2022-03-14 RX ADMIN — TROPICAMIDE 1 DROP: 10 SOLUTION/ DROPS OPHTHALMIC at 10:05

## 2022-03-14 RX ADMIN — MOXIFLOXACIN 1 DROP: 5 SOLUTION/ DROPS OPHTHALMIC at 10:28

## 2022-03-14 RX ADMIN — PHENYLEPHRINE HYDROCHLORIDE 1 DROP: 25 SOLUTION/ DROPS OPHTHALMIC at 10:15

## 2022-03-14 RX ADMIN — SODIUM CHLORIDE, POTASSIUM CHLORIDE, SODIUM LACTATE AND CALCIUM CHLORIDE: 600; 310; 30; 20 INJECTION, SOLUTION INTRAVENOUS at 12:40

## 2022-03-14 NOTE — PROGRESS NOTES
SPIRITUAL HEALTH SERVICES  [facility] ([facility]) PRE- OP   PRE-SURGERY VISIT    Had pre-surgery visit with pt.  Provided spiritual support, prayer.     Claudia Jacobin Resident  Phone: [phone]

## 2022-03-14 NOTE — ANESTHESIA CARE TRANSFER NOTE
Patient: Kyung FATIMAH Adair    Procedure: Procedure(s):  Right PHACOEMULSIFICATION, CATARACT, WITH INTRAOCULAR LENS IMPLANT       Diagnosis: Cataract [H26.9]  Diagnosis Additional Information: No value filed.    Anesthesia Type:   MAC     Note:    Oropharynx: spontaneously breathing and oropharynx clear of all foreign objects  Level of Consciousness: awake  Oxygen Supplementation: room air    Independent Airway: airway patency satisfactory and stable  Dentition: dentition unchanged  Vital Signs Stable: post-procedure vital signs reviewed and stable  Report to RN Given: handoff report given  Patient transferred to: Phase II  Comments: Pt recovering from her MAC anesthetic in phase II. Pt VSS upon arrival to phase II. Pt has no c/o pain/N/V. Pt care report given to receiving RN.   Handoff Report: Identifed the Patient, Identified the Reponsible Provider, Reviewed the pertinent medical history, Discussed the surgical course, Reviewed Intra-OP anesthesia mangement and issues during anesthesia, Set expectations for post-procedure period and Allowed opportunity for questions and acknowledgement of understanding      Vitals:  Vitals Value Taken Time   BP     Temp     Pulse     Resp     SpO2 96 % 03/14/22 1313   Vitals shown include unvalidated device data.    Electronically Signed By: GUERA Wright CRNA  March 14, 2022  1:14 PM

## 2022-03-14 NOTE — OP NOTE
Preoperative diagnosis: Visually significant cataract, Right eye  Postoperative diagnosis: Same    Procedure: Phacoemulsification cataract extraction with intraocular lens implantation, Right eye    Surgeon: Ede Ford MD    Anesthesia: Topical with monitored anesthesia care    Complication: None    Indication for Procedure: The patient has noted a painless, progressive loss of vision. Examination showed that the cataract was a significant cause of the decreased vision.    Description of procedure: After informed consent was confirmed, the patient was brought to the operating room where the patient was sterilly prepped and draped. A lid speculum was placed. A supersharp blade was used to make a paracentesis superiorly. Nonpreserved lidocaine was injected into the anterior chamber. Amvisc Plus was also injected into the anterior chamber. A 2.5 mm keratome was used to make an incision at 9:00. A cystotome was used to start the capsulorrhexis. It was completed with a Utrata forceps. The lens was hydrodissected with balanced salt saline. The lens was removed using a four quadrant divide and conquer technique. The cortex was aspirated. The posterior capsule was intact. The capsular bag was filled with Amvisc Plus. A posterior chamber lens was injected into the capsular bag. The Amvisc Plus was aspirated. The wounds were hydrated to promote closure. The anterior chamber was injected with intracameral cefuroxime. A drop of betadine was placed on the eye. A drop of timolol followed.  A shield was placed over the eye.    The lens implanted was a 10.5 ZCB00

## 2022-03-14 NOTE — ANESTHESIA POSTPROCEDURE EVALUATION
Patient: Kyung Adair    Procedure: Procedure(s):  Right PHACOEMULSIFICATION, CATARACT, WITH INTRAOCULAR LENS IMPLANT       Anesthesia Type:  MAC    Note:  Disposition: Outpatient   Postop Pain Control: Uneventful            Sign Out: Well controlled pain   PONV: No   Neuro/Psych: Uneventful            Sign Out: Acceptable/Baseline neuro status   Airway/Respiratory: Uneventful            Sign Out: Acceptable/Baseline resp. status   CV/Hemodynamics: Uneventful            Sign Out: Acceptable CV status; No obvious hypovolemia; No obvious fluid overload   Other NRE: NONE   DID A NON-ROUTINE EVENT OCCUR? No    Event details/Postop Comments:  Tolerated well with mild sedation and music.              Last vitals:  Vitals Value Taken Time   /78 03/14/22 1349   Temp 37  C (98.6  F) 03/14/22 1345   Pulse 79 03/14/22 1349   Resp 16 03/14/22 1345   SpO2 95 % 03/14/22 1355   Vitals shown include unvalidated device data.    Electronically Signed By: Liset Corona MD  March 14, 2022  2:52 PM

## 2022-03-14 NOTE — DISCHARGE INSTRUCTIONS
[facility]--[facility]    Discharge Instructions after Cataract Surgery  [facility]  [phone]  General Reminders:  If you have a gauze eye patch on, please do not remove it until it is removed by your doctor at your first appointment after your surgery.  You will start your eye drops the next day.    OR    If you only have a clear eye shield on, you may remove the eye shield when you get home and begin eye drops today as directed by your doctor.    You have a follow-up appointment with your doctor today or tomorrow at the [facility]     Bring all your prescribed eye drops with you to this follow-up appointment.      If you take glaucoma medications, bring them with you to your follow-up appointment tomorrow.    General Care:    Wear the clear eye shield for protection when sleeping for the next 5 days.    Do not rub the eye that had the operation.    Your eye might be sensitive to light.  Wearing sunglasses may be more comfortable for you.    You may have some discomfort and irritation.  Acetaminophen (Tylenol) or Ibuprofen (Advil) may be taken for discomfort. If pain persists please call your doctor's office.    Keep the eye that had the surgery dry. You may wash your hair, bathe or shower, but keep your eye closed while doing so.     Avoid bending over, strenuous activity or heavy lifting until this activity has been approved by your doctor.    Use medication exactly as prescribed by your doctor.   o Wait 1 -3 minutes between eye drops.      You may restart your regular home medications.     Occasional blood-tinged tears are normal the day or two after surgery. However, if there is large or persistent bleeding from the eye, that is not normal, and you should contact the clinic.      Please call [phone] and wait for the prompts to reach the on-call doctor for any of the following symptoms:    Any sudden vision changes,  including decreased vision    Nausea or severe headache    Increase in pain that is not controlled with Acetaminophen (Tylenol) or Ibuprofen (Advil)    Signs of infection (pus, increasing redness or tenderness)    Severe sensitivity to light    An increase in floaters (black spots in front of your vision)      Call for any problems or questions:  [facility]   [phone]    There is always someone on call, even on weekends    Same-Day Surgery   Adult Discharge Orders & Instructions     For 24 hours after surgery:  1. Get plenty of rest.  A responsible adult must stay with you for at least 24 hours after you leave the hospital.   2. Pain medication can slow your reflexes. Do not drive or use heavy equipment.  If you have weakness or tingling, don't drive or use heavy equipment until this feeling goes away.  3. Mixing alcohol and pain medication can cause dizziness and slow your breathing. It can even be fatal. Do not drink alcohol while taking pain medication.  4. Avoid strenuous or risky activities.  Ask for help when climbing stairs.   5. You may feel lightheaded.  If so, sit for a few minutes before standing.  Have someone help you get up.   6. If you have nausea (feel sick to your stomach), drink only clear liquids such as apple juice, ginger ale, broth or 7-Up.  Rest may also help.  Be sure to drink enough fluids.  Move to a regular diet as you feel able. Take pain medications with a small amount of solid food, such as toast or crackers, to avoid nausea.   7. A slight fever is normal. Call the doctor if your fever is over 100 F (37.7 C) (taken under the tongue) or lasts longer than 24 hours.  8. You may have a dry mouth, muscle aches, trouble sleeping or a sore throat.  These symptoms should go away after 24 hours.  9. Do not make important or legal decisions.   Pain Management:      1. Take pain medication (if prescribed) for pain as directed by your physician.        2. WARNING: If  the pain medication you have been prescribed contains Tylenol  (acetaminophen), DO NOT take additional doses of Tylenol (acetaminophen).     Call your doctor for any of the followin.  Signs of infection (fever, growing tenderness at the surgery site, severe pain, a large amount of drainage or bleeding, foul-smelling drainage, redness, swelling).    2.  It has been over 8 to 10 hours since surgery and you are still not able to urinate (pee).    3.  Headache for over 24 hours.    4.  Numbness, tingling or weakness the day after surgery (if you had spinal anesthesia).  To contact a doctor, call Dr. Ford at [phone] or:      [phone] and ask for the Resident On Call for:          Ophthalmology (answered 24 hours a day)      Emergency Department:  [place] [facility]: [phone]  [place] [facility]: [phone]               Rev. 10/2014

## 2022-03-14 NOTE — ANESTHESIA PREPROCEDURE EVALUATION
"Writer spoke with a nurse from [facility]. Per Nadege Barros's note on 11-27-18 it reads that ""labs are stable and to repeat labs in one week once patient has started metolazone\"". Nurse would like that order to be faxed to [facility] for future reference for the nursing staff. Writer will fax over request. No further inquiries at this time.   " Anesthesia Pre-Procedure Evaluation    Patient: Kyung Adair   MRN: [id number] : 1960        Procedure : Procedure(s):  Right PHACOEMULSIFICATION, CATARACT, WITH INTRAOCULAR LENS IMPLANT          Past Medical History:   Diagnosis Date     Acute idiopathic gout of left ankle      Antiplatelet or antithrombotic long-term use      Asthma     mild, intermittent     Chronic anticoagulation      Hyperlipidemia      Hypertension      Hypothyroidism      Insulin resistance      Left leg DVT (H)     ,      Morbid obesity (H)      PCOS (polycystic ovarian syndrome)      PE (pulmonary embolism)      Prediabetes      Primary osteoarthritis of both knees      Right leg DVT (H)      and      Stress incontinence      Uterine cancer (H)       Past Surgical History:   Procedure Laterality Date     ARTHROSCOPY SHOULDER DISTAL CLAVICLE REPAIR  2011    Procedure:ARTHROSCOPY SHOULDER DISTAL CLAVICLE RESECTION; Left Shoulder Arthroscopic Distal Clavicle Excision, Biceps Tenotomy,  Arthroscopic Rotator Cuff Repair and Subacromial Decompression; Surgeon:PETRA MARX; Location:[facility] OR     ARTHROSCOPY SHOULDER ROTATOR CUFF REPAIR  2011    Procedure:ARTHROSCOPY SHOULDER ROTATOR CUFF REPAIR; Surgeon:PETRA MARX; Location:[facility] OR     ARTHROSCOPY SHOULDER ROTATOR CUFF REPAIR Right 2016    Procedure: ARTHROSCOPY SHOULDER ROTATOR CUFF REPAIR;  Surgeon: Petra Marx MD;  Location: [facility] OR     BLEPHAROPLASTY BILATERAL       BREAST SURGERY      left breast mole benign     GYN SURGERY      hysterectomy for uterine cancer     HERNIA REPAIR      ventral hernia and incisional hernia repair     HYSTERECTOMY       ORTHOPEDIC SURGERY      ORIF right foot     PHACOEMULSIFICATION CLEAR CORNEA WITH TORIC INTRAOCULAR LENS IMPLANT  10/23/2012    Procedure: PHACOEMULSIFICATION CLEAR CORNEA WITH TORIC INTRAOCULAR LENS IMPLANT;  LEFT EYE PHACOEMULSIFICATION CLEAR CORNEA WITH TORIC  INTRAOCULAR LENS  IMPLANT ;  Surgeon: Garry Montilla MD;  Location: [facility]     SINUS SURGERY       TONSILLECTOMY        Allergies   Allergen Reactions     Dilaudid [Hydromorphone Hcl] Anaphylaxis     Allopurinol Hives     Atorvastatin Unknown     Atorvastatin Calcium Muscle Pain (Myalgia), Diarrhea and GI Disturbance     Codeine Sulfate Unknown     Enoxaparin Hives and Difficulty breathing     Not allergic to name brand lovenox. Had reactions to generic enoxaparin.     Imitrex [Sumatriptan Succinate] Other (See Comments)     dizzyness       Niacin Hives     No Clinical Screening - See Comments Other (See Comments)     Perfumes and fragrance  Causes wheezing and chest heaviness     Penicillins Unknown     Pravachol [Pravastatin Sodium] Muscle Pain (Myalgia), Diarrhea and GI Disturbance     Sulfa Drugs Unknown     Sumatriptan      Dizzy and shakey     Welchol [Colesevelam Hcl] Muscle Pain (Myalgia), Diarrhea and GI Disturbance      Social History     Tobacco Use     Smoking status: Never Smoker     Smokeless tobacco: Never Used   Substance Use Topics     Alcohol use: Yes     Comment: 1-2 cans of beer/week      Wt Readings from Last 1 Encounters:   08/24/16 132.5 kg (292 lb 1.8 oz)        Anesthesia Evaluation   Pt has had prior anesthetic.         ROS/MED HX  ENT/Pulmonary:     (+) asthma     Neurologic:       Cardiovascular:     (+) hypertension-----Taking blood thinners     METS/Exercise Tolerance:     Hematologic:     (+) History of blood clots,     Musculoskeletal:       GI/Hepatic:       Renal/Genitourinary:  - neg Renal ROS     Endo:     (+) thyroid problem, hypothyroidism, Obesity,     Psychiatric/Substance Use:       Infectious Disease:       Malignancy:       Other:               OUTSIDE LABS:  CBC:   Lab Results   Component Value Date    WBC 7.7 08/10/2016    HGB 11.3 (L) 08/25/2016    HGB 12.5 08/24/2016    HCT 39.6 08/10/2016     08/10/2016     BMP:   Lab Results   Component Value Date     08/10/2016      08/20/2011    POTASSIUM 3.8 08/10/2016    POTASSIUM 4.2 08/20/2011    CHLORIDE 104 08/10/2016    CHLORIDE 100 08/20/2011    CO2 26 08/10/2016    CO2 28 08/20/2011    BUN 12 08/10/2016    BUN 11 08/20/2011    CR 0.80 08/10/2016    CR 0.80 08/20/2011     (H) 08/25/2016    GLC 86 08/10/2016     COAGS:   Lab Results   Component Value Date    PTT 25 08/24/2016    INR 1.09 08/25/2016     POC:   Lab Results   Component Value Date     (H) 08/20/2011     HEPATIC: No results found for: ALBUMIN, PROTTOTAL, ALT, AST, GGT, ALKPHOS, BILITOTAL, BILIDIRECT, ABBI  OTHER:   Lab Results   Component Value Date    BARBARA 8.8 08/10/2016    SED 13 05/06/2011       Anesthesia Plan    ASA Status:  3      Anesthesia Type: MAC.     - Reason for MAC: chronic cardiopulmonary disease              Consents            Postoperative Care            Comments:                Liset Corona MD

## 2022-03-14 NOTE — BRIEF OP NOTE
[facility]    Brief Operative Note    Pre-operative diagnosis: Cataract [H26.9]  Post-operative diagnosis cataract, right    Procedure: Procedure(s):  Right PHACOEMULSIFICATION, CATARACT, WITH INTRAOCULAR LENS IMPLANT  Surgeon: Surgeon(s) and Role:     * Ede Ford MD - Primary  Anesthesia: Combined MAC with Local   Estimated Blood Loss: None    Drains: None  Specimens: * No specimens in log *  Findings:   None.  Complications: None.  Implants:   Implant Name Type Inv. Item Serial No.  Lot No. LRB No. Used Action   EYE IMP IOL KANWAL PCL TECNIS [id number] 10.5 - [id number] Lens/Eye Implant EYE IMP IOL KANWAL PCL TECNIS ZCB00 10.5 [id number] [employer]  Right 1 Implanted

## 2022-06-07 ENCOUNTER — APPOINTMENT (OUTPATIENT)
Dept: URBAN - METROPOLITAN AREA CLINIC 254 | Age: 62
Setting detail: DERMATOLOGY
End: 2022-06-10

## 2022-06-07 VITALS — WEIGHT: 278 LBS | HEIGHT: 64 IN

## 2022-06-07 DIAGNOSIS — L82.1 OTHER SEBORRHEIC KERATOSIS: ICD-10-CM

## 2022-06-07 DIAGNOSIS — L20.89 OTHER ATOPIC DERMATITIS: ICD-10-CM

## 2022-06-07 DIAGNOSIS — B07.8 OTHER VIRAL WARTS: ICD-10-CM

## 2022-06-07 DIAGNOSIS — Z85.828 PERSONAL HISTORY OF OTHER MALIGNANT NEOPLASM OF SKIN: ICD-10-CM

## 2022-06-07 DIAGNOSIS — Z71.89 OTHER SPECIFIED COUNSELING: ICD-10-CM

## 2022-06-07 DIAGNOSIS — L82.0 INFLAMED SEBORRHEIC KERATOSIS: ICD-10-CM

## 2022-06-07 PROBLEM — L20.84 INTRINSIC (ALLERGIC) ECZEMA: Status: ACTIVE | Noted: 2022-06-07

## 2022-06-07 PROCEDURE — OTHER LIQUID NITROGEN: OTHER

## 2022-06-07 PROCEDURE — OTHER PHOTO-DOCUMENTATION: OTHER

## 2022-06-07 PROCEDURE — 99213 OFFICE O/P EST LOW 20 MIN: CPT | Mod: 25

## 2022-06-07 PROCEDURE — 17110 DESTRUCT B9 LESION 1-14: CPT

## 2022-06-07 PROCEDURE — OTHER COUNSELING: OTHER

## 2022-06-07 PROCEDURE — OTHER MONITORING: OTHER

## 2022-06-07 PROCEDURE — OTHER CANTHARIDIN: OTHER

## 2022-06-07 ASSESSMENT — LOCATION SIMPLE DESCRIPTION DERM
LOCATION SIMPLE: RIGHT FOOT
LOCATION SIMPLE: LEFT EAR
LOCATION SIMPLE: NOSE
LOCATION SIMPLE: RIGHT FOREHEAD
LOCATION SIMPLE: RIGHT INDEX FINGER
LOCATION SIMPLE: LEFT LOWER BACK
LOCATION SIMPLE: RIGHT LOWER BACK

## 2022-06-07 ASSESSMENT — LOCATION DETAILED DESCRIPTION DERM
LOCATION DETAILED: NASAL DORSUM
LOCATION DETAILED: RIGHT DORSAL FOOT
LOCATION DETAILED: LEFT SUPERIOR MEDIAL LOWER BACK
LOCATION DETAILED: LEFT SUPERIOR HELIX
LOCATION DETAILED: RIGHT SUPERIOR MEDIAL MIDBACK
LOCATION DETAILED: RIGHT INDEX FINGERTIP
LOCATION DETAILED: RIGHT FOREHEAD

## 2022-06-07 ASSESSMENT — LOCATION ZONE DERM
LOCATION ZONE: EAR
LOCATION ZONE: NOSE
LOCATION ZONE: TRUNK
LOCATION ZONE: FINGER
LOCATION ZONE: FACE
LOCATION ZONE: FEET

## 2022-06-07 NOTE — HPI: FULL BODY SKIN EXAMINATION
What Type Of Note Output Would You Prefer (Optional)?: Bullet Format
What Is The Reason For Today's Visit?: Full Body Skin Examination with No Concerns
What Is The Reason For Today's Visit? (Being Monitored For X): the risk of recurrence of previously treated lesion(s)
Additional History: Patient reports a new itchy lesion along her right hairline. Patient presents for full body skin exam.

## 2022-06-07 NOTE — PROCEDURE: LIQUID NITROGEN
Add 52 Modifier (Optional): no
Post-Care Instructions: - Avoid picking at any of the treated lesions.
Medical Necessity Information: It is in your best interest to select a reason for this procedure from the list below. All of these items fulfill various CMS LCD requirements except the new and changing color options.
Medical Necessity Clause: This procedure was medically necessary because the lesions that were treated were:
Spray Paint Text: The liquid nitrogen was applied to the skin utilizing a spray paint frosting technique.
Render Post-Care Instructions In Note?: yes
Consent: - Verbal and written consent was obtained, and risks were reviewed prior to procedure today. \\n- Risks discussed include but are not limited to pain, crusting, scabbing, blistering, scarring, temporary or permanent darker or lighter pigmentary change, recurrence, incomplete resolution, and infection.
Detail Level: Detailed

## 2022-06-07 NOTE — PROCEDURE: CANTHARIDIN
Canthacur Duration Text (Please Remove Duration From Postcare): The patient was instructed to leave the Canthacur on for 6-8 hours and then wash the area well with soap and water.
Cantharone Plus Duration Text (Please Remove Duration From Postcare): The patient was instructed to leave the Cantharone Plus on for 6-8 hours and then wash the area well with soap and water.
Add 52 Modifier (Optional): no
Cantharone Duration Text (Please Remove Duration From Postcare): The patient was instructed to leave the Cantharone on for 2 hours and then wash the area well with soap and water.
Medical Necessity Clause: This procedure was medically necessary because the lesions that were treated were:
Consent: The patient's consent was obtained including but not limited to risks of crusting, scabbing, scarring, blistering, darker or lighter pigmentary change, recurrence, incomplete removal and infection.
Medical Necessity Information: It is in your best interest to select a reason for this procedure from the list below. All of these items fulfill various CMS LCD requirements except the new and changing color options.
Curette Text: Prior to application of cantharidin the lesions were lightly pared with a curette.
Cantharone Forte Duration Text (Please Remove Duration From Postcare): The patient was instructed to leave the Cantharone Forte on for 6-8 hours and then wash the area well with soap and water.
Strength: Alon
Canthacur Ps Duration Text (Please Remove Duration From Postcare): The patient was instructed to leave the Canthacur PS on for 6-8 hours and then wash the area well with soap and water.
Post-Care Instructions: I reviewed with the patient in detail post-care instructions. The patient understands that the treated areas should be washed off 2 hours after application.
Detail Level: Detailed

## 2022-07-12 ENCOUNTER — APPOINTMENT (OUTPATIENT)
Dept: URBAN - METROPOLITAN AREA CLINIC 254 | Age: 62
Setting detail: DERMATOLOGY
End: 2022-07-13

## 2022-07-12 VITALS — WEIGHT: 278 LBS | HEIGHT: 64 IN

## 2022-07-12 DIAGNOSIS — L82.0 INFLAMED SEBORRHEIC KERATOSIS: ICD-10-CM

## 2022-07-12 DIAGNOSIS — B07.8 OTHER VIRAL WARTS: ICD-10-CM

## 2022-07-12 PROCEDURE — OTHER LIQUID NITROGEN: OTHER

## 2022-07-12 PROCEDURE — OTHER BENIGN DESTRUCTION: OTHER

## 2022-07-12 PROCEDURE — 17110 DESTRUCT B9 LESION 1-14: CPT

## 2022-07-12 PROCEDURE — OTHER COUNSELING: OTHER

## 2022-07-12 ASSESSMENT — LOCATION SIMPLE DESCRIPTION DERM
LOCATION SIMPLE: RIGHT TEMPLE
LOCATION SIMPLE: RIGHT INDEX FINGER

## 2022-07-12 ASSESSMENT — LOCATION DETAILED DESCRIPTION DERM
LOCATION DETAILED: RIGHT INDEX FINGERTIP
LOCATION DETAILED: RIGHT CENTRAL TEMPLE

## 2022-07-12 ASSESSMENT — LOCATION ZONE DERM
LOCATION ZONE: FACE
LOCATION ZONE: FINGER

## 2022-07-12 NOTE — PROCEDURE: BENIGN DESTRUCTION
Post-Care Instructions: - Patient should keep wound(s) covered and call the office should any redness, pain, swelling or worsening occur.\\n\\nWOUND CARE:\\nDo NOT submerge wound in a bath, swimming pool, or hot tub for at least 1 week or for as long as there is an open wound. Gently cleanse the site daily with mild soap and water. Be careful NOT to allow a forceful stream of water to hit the wound directly. After cleaning/showering, cover with gauze and paper tape or an adhesive bandage. Continue this process daily until healed. \\n\\nBLEEDING:\\nIf you develop persistent bleeding, apply firm and steady pressure over the dressing with gauze for 15 minutes. If bleeding persists, reapply pressure with an ice pack over the gauze for 15 minutes. NEVER APPLY ICE DIRECTLY TO THE SKIN. Do NOT peek under the gauze during these 15 minutes of pressure.  Call our office at [phone] if you cannot get the bleeding to stop. \\n\\nINFECTION:\\nSigns of infection may include increasing rather than decreasing pain (after the first few days), increasing redness/swelling/heat, draining pus, pink/red streaks around the wound, and fever or chills.  Please call our office immediately at [phone] if infection is suspected. It is normal to have some mild redness on or around the wound edges; this will lighten day by day and will become less tender.\\n\\nPAIN:\\nPain is usually minimal, but if needed you may take acetaminophen (Tylenol) every four hours as needed. Applying an ice pack over the dressing for 15-20 minutes every 2-3 hours will relieve swelling, lessen pain, and help minimize bruising. NEVER APPLY ICE DIRECTLY TO THE SKIN. Avoid ibuprofen (Advil, Motrin) and naproxen (Aleve) for the first 48 hours as these can increase bleeding.\\n\\nSWELLING AND BRUISING:\\nSwelling and bruising are common and temporary, usually lasting 1 - 2 weeks. It is more common in areas treated around the eyes, hands, and feet. In the days following the procedure, swelling and bruising can be minimized by keeping the affected areas elevated when possible, reducing salty foods, and applying ice packs over the dressing for 15-20 minutes every 2-3 hours. NEVER APPLY ICE DIRECTLY TO THE SKIN.\\n\\nITCHING:\\nItchiness on and around the wound is very common and can last several days to weeks after surgery. Mild itch is normal as the wound is healing. \\n\\nNERVE CHANGES:\\nNumbness is usually temporary, but it may last for several weeks to months. You may also experience sharp pains at the wound sight as it heals.  Mild pain is normal and will gradually improve with time.\\n \\nNO SMOKING:\\nSmoking will delay the healing process. If you smoke, we recommend trying to quit or at minimum reduce how much you smoke following a procedure.
Medical Necessity Clause: This procedure was medically necessary because the lesions that were treated were:
Treatment Number (Will Not Render If 0): 1
Consent: - Verbal and written consent was obtained, and risks were reviewed prior to procedure today. \\n- Risks discussed include but are not limited to scarring, hypo or hyper-pigmentation, infection, bleeding, blistering, \"ring around the wart\" phenomenon, recurrence, nerve damage, and pain.\\n- Prior to the procedure, the treatment site(s) was clearly identified and confirmed by the patient. \\n- All components of Universal Protocol/PAUSE Rule completed.
Render Post-Care Instructions In Note?: no
Medical Necessity Information: It is in your best interest to select a reason for this procedure from the list below. All of these items fulfill various CMS LCD requirements except the new and changing color options.
Render Note In Bullet Format When Appropriate: Yes
Detail Level: Detailed
Anesthesia Volume In Cc: 0

## 2022-07-12 NOTE — PROCEDURE: LIQUID NITROGEN
Include Z78.9 (Other Specified Conditions Influencing Health Status) As An Associated Diagnosis?: No
Render Note In Bullet Format When Appropriate: Yes
Detail Level: Detailed
Medical Necessity Information: It is in your best interest to select a reason for this procedure from the list below. All of these items fulfill various CMS LCD requirements except the new and changing color options.
Consent: - Verbal and written consent was obtained, and risks were reviewed prior to procedure today. \\n- Risks discussed include but are not limited to pain, crusting, scabbing, blistering, scarring, temporary or permanent darker or lighter pigmentary change, recurrence, incomplete resolution, and infection.
Medical Necessity Clause: This procedure was medically necessary because the lesions that were treated were:
Spray Paint Text: The liquid nitrogen was applied to the skin utilizing a spray paint frosting technique.
Post-Care Instructions: - Avoid picking at any of the treated lesions.

## 2022-08-02 ENCOUNTER — APPOINTMENT (OUTPATIENT)
Dept: URBAN - METROPOLITAN AREA CLINIC 254 | Age: 62
Setting detail: DERMATOLOGY
End: 2022-08-02

## 2022-08-02 DIAGNOSIS — B07.8 OTHER VIRAL WARTS: ICD-10-CM

## 2022-08-02 PROCEDURE — OTHER BLEOMYCIN: OTHER

## 2022-08-02 PROCEDURE — 11900 INJECT SKIN LESIONS </W 7: CPT

## 2022-08-02 ASSESSMENT — LOCATION SIMPLE DESCRIPTION DERM: LOCATION SIMPLE: RIGHT INDEX FINGER

## 2022-08-02 ASSESSMENT — LOCATION ZONE DERM: LOCATION ZONE: FINGER

## 2022-08-02 ASSESSMENT — LOCATION DETAILED DESCRIPTION DERM: LOCATION DETAILED: RIGHT INDEX FINGERTIP

## 2022-08-30 ENCOUNTER — LAB (OUTPATIENT)
Dept: LAB | Facility: CLINIC | Age: 62
End: 2022-08-30
Payer: COMMERCIAL

## 2022-08-30 DIAGNOSIS — Z20.822 ENCOUNTER FOR LABORATORY TESTING FOR COVID-19 VIRUS: ICD-10-CM

## 2022-08-30 LAB — SARS-COV-2 RNA RESP QL NAA+PROBE: NEGATIVE

## 2022-08-30 PROCEDURE — U0005 INFEC AGEN DETEC AMPLI PROBE: HCPCS

## 2022-08-30 PROCEDURE — U0003 INFECTIOUS AGENT DETECTION BY NUCLEIC ACID (DNA OR RNA); SEVERE ACUTE RESPIRATORY SYNDROME CORONAVIRUS 2 (SARS-COV-2) (CORONAVIRUS DISEASE [COVID-19]), AMPLIFIED PROBE TECHNIQUE, MAKING USE OF HIGH THROUGHPUT TECHNOLOGIES AS DESCRIBED BY CMS-2020-01-R: HCPCS

## 2022-09-13 ENCOUNTER — APPOINTMENT (OUTPATIENT)
Dept: URBAN - METROPOLITAN AREA CLINIC 254 | Age: 62
Setting detail: DERMATOLOGY
End: 2022-09-14

## 2022-09-13 DIAGNOSIS — B07.8 OTHER VIRAL WARTS: ICD-10-CM

## 2022-09-13 PROCEDURE — OTHER ADDITIONAL NOTES: OTHER

## 2022-09-13 PROCEDURE — OTHER COUNSELING: OTHER

## 2022-09-13 PROCEDURE — 99213 OFFICE O/P EST LOW 20 MIN: CPT

## 2022-09-13 PROCEDURE — OTHER MIPS QUALITY: OTHER

## 2022-09-13 ASSESSMENT — LOCATION ZONE DERM: LOCATION ZONE: FINGER

## 2022-09-13 ASSESSMENT — LOCATION SIMPLE DESCRIPTION DERM: LOCATION SIMPLE: RIGHT INDEX FINGER

## 2022-09-13 ASSESSMENT — LOCATION DETAILED DESCRIPTION DERM: LOCATION DETAILED: RIGHT INDEX FINGERTIP

## 2022-09-13 NOTE — PROCEDURE: ADDITIONAL NOTES
Render Risk Assessment In Note?: no
Detail Level: Simple
Additional Notes: Discussed with patient that if she experiences a recurrence in the next month or two, we will plan on retreating with bleomycin. Patient expressed understanding.

## 2022-09-13 NOTE — PROCEDURE: MIPS QUALITY
Quality 431: Preventive Care And Screening: Unhealthy Alcohol Use - Screening: Patient not identified as an unhealthy alcohol user when screened for unhealthy alcohol use using a systematic screening method
Quality 226: Preventive Care And Screening: Tobacco Use: Screening And Cessation Intervention: Patient screened for tobacco use and is an ex/non-smoker
Quality 110: Preventive Care And Screening: Influenza Immunization: Influenza Immunization previously received during influenza season
Detail Level: Detailed
Quality 130: Documentation Of Current Medications In The Medical Record: Current Medications Documented
Attending Attestation (For Attendings USE Only)...

## 2022-10-10 ENCOUNTER — HEALTH MAINTENANCE LETTER (OUTPATIENT)
Age: 62
End: 2022-10-10

## 2022-10-13 ENCOUNTER — VIRTUAL VISIT (OUTPATIENT)
Dept: FAMILY MEDICINE | Facility: CLINIC | Age: 62
End: 2022-10-13
Payer: COMMERCIAL

## 2022-10-13 ENCOUNTER — LAB (OUTPATIENT)
Dept: LAB | Facility: CLINIC | Age: 62
End: 2022-10-13
Attending: FAMILY MEDICINE
Payer: COMMERCIAL

## 2022-10-13 DIAGNOSIS — U07.1 LAB TEST POSITIVE FOR DETECTION OF COVID-19 VIRUS: ICD-10-CM

## 2022-10-13 DIAGNOSIS — U07.1 LAB TEST POSITIVE FOR DETECTION OF COVID-19 VIRUS: Primary | ICD-10-CM

## 2022-10-13 PROCEDURE — 99202 OFFICE O/P NEW SF 15 MIN: CPT | Mod: CS | Performed by: FAMILY MEDICINE

## 2022-10-13 PROCEDURE — U0003 INFECTIOUS AGENT DETECTION BY NUCLEIC ACID (DNA OR RNA); SEVERE ACUTE RESPIRATORY SYNDROME CORONAVIRUS 2 (SARS-COV-2) (CORONAVIRUS DISEASE [COVID-19]), AMPLIFIED PROBE TECHNIQUE, MAKING USE OF HIGH THROUGHPUT TECHNOLOGIES AS DESCRIBED BY CMS-2020-01-R: HCPCS

## 2022-10-13 PROCEDURE — U0005 INFEC AGEN DETEC AMPLI PROBE: HCPCS

## 2022-10-13 NOTE — PROGRESS NOTES
"Kyung is a 61 year old who is being evaluated via a billable telephone visit.      What phone number would you like to be contacted at? [phone]  [facility]    Assessment & Plan     Lab test positive for detection of COVID-19 virus  Recently had positive COVID-19 test through [facility]. Has upcoming surgery on 10/18/2022. Would like to repeat testing as she is completely asymptoamtic. Future PCR test ordered.  - Asymptomatic COVID-19 Virus (Coronavirus) by PCR; Future             BMI:   Estimated body mass index is 48.1 kg/m  as calculated from the following:    Height as of 3/14/22: 1.626 m (5' 4\").    Weight as of 3/14/22: 127.1 kg (280 lb 3.3 oz).       Return in about 1 week (around 10/20/2022) for follow up if symptoms not improving.    Raymundo Griffin DO  [facility] [place]    Subjective   Kyung is a 61 year old, presenting for the following health issues:  Telephone      History of Present Illness       Reason for visit:  Requesting PCR Covid test, has upcoming surgery,  tested + at pre op visit at [facility] today - clinic said it was a PCR test     She eats 2-3 servings of fruits and vegetables daily.She consumes 1 sweetened beverage(s) daily.She exercises with enough effort to increase her heart rate 30 to 60 minutes per day.  She exercises with enough effort to increase her heart rate 4 days per week.   She is taking medications regularly.     Nearest [facility] is [place]       Review of Systems   Constitutional, HEENT, cardiovascular, pulmonary, gi and gu systems are negative, except as otherwise noted.      Objective           Vitals:  No vitals were obtained today due to virtual visit.    Physical Exam   healthy, alert and no distress  PSYCH: Alert and oriented times 3; coherent speech, normal   rate and volume, able to articulate logical thoughts, able   to abstract reason, no tangential thoughts, no " hallucinations   or delusions  Her affect is normal  RESP: No cough, no audible wheezing, able to talk in full sentences  Remainder of exam unable to be completed due to telephone visits            Phone call duration: 8 minutes

## 2022-10-14 LAB — SARS-COV-2 RNA RESP QL NAA+PROBE: NEGATIVE

## 2022-11-29 ENCOUNTER — APPOINTMENT (OUTPATIENT)
Dept: URBAN - METROPOLITAN AREA CLINIC 254 | Age: 62
Setting detail: DERMATOLOGY
End: 2022-11-29

## 2022-11-29 VITALS — HEIGHT: 64 IN | RESPIRATION RATE: 14 BRPM | WEIGHT: 278 LBS

## 2022-11-29 DIAGNOSIS — Z71.89 OTHER SPECIFIED COUNSELING: ICD-10-CM

## 2022-11-29 DIAGNOSIS — L82.0 INFLAMED SEBORRHEIC KERATOSIS: ICD-10-CM

## 2022-11-29 DIAGNOSIS — L57.0 ACTINIC KERATOSIS: ICD-10-CM

## 2022-11-29 DIAGNOSIS — L81.0 POSTINFLAMMATORY HYPERPIGMENTATION: ICD-10-CM

## 2022-11-29 DIAGNOSIS — L82.1 OTHER SEBORRHEIC KERATOSIS: ICD-10-CM

## 2022-11-29 DIAGNOSIS — L81.4 OTHER MELANIN HYPERPIGMENTATION: ICD-10-CM

## 2022-11-29 DIAGNOSIS — Z85.828 PERSONAL HISTORY OF OTHER MALIGNANT NEOPLASM OF SKIN: ICD-10-CM

## 2022-11-29 DIAGNOSIS — I83.1 VARICOSE VEINS OF LOWER EXTREMITIES WITH INFLAMMATION: ICD-10-CM

## 2022-11-29 PROBLEM — I83.12 VARICOSE VEINS OF LEFT LOWER EXTREMITY WITH INFLAMMATION: Status: ACTIVE | Noted: 2022-11-29

## 2022-11-29 PROCEDURE — OTHER MIPS QUALITY: OTHER

## 2022-11-29 PROCEDURE — 99213 OFFICE O/P EST LOW 20 MIN: CPT | Mod: 25

## 2022-11-29 PROCEDURE — 17000 DESTRUCT PREMALG LESION: CPT | Mod: 59

## 2022-11-29 PROCEDURE — 17110 DESTRUCT B9 LESION 1-14: CPT

## 2022-11-29 PROCEDURE — OTHER COUNSELING: OTHER

## 2022-11-29 PROCEDURE — OTHER LIQUID NITROGEN: OTHER

## 2022-11-29 ASSESSMENT — LOCATION SIMPLE DESCRIPTION DERM
LOCATION SIMPLE: LEFT EAR
LOCATION SIMPLE: RIGHT INDEX FINGER
LOCATION SIMPLE: RIGHT PRETIBIAL REGION
LOCATION SIMPLE: RIGHT EAR
LOCATION SIMPLE: RIGHT FOOT
LOCATION SIMPLE: RIGHT TEMPLE
LOCATION SIMPLE: LEFT ACHILLES SKIN
LOCATION SIMPLE: LEFT LOWER BACK
LOCATION SIMPLE: RIGHT LOWER BACK
LOCATION SIMPLE: LEFT ANKLE
LOCATION SIMPLE: NOSE
LOCATION SIMPLE: LEFT FOREHEAD

## 2022-11-29 ASSESSMENT — LOCATION ZONE DERM
LOCATION ZONE: NOSE
LOCATION ZONE: LEG
LOCATION ZONE: EAR
LOCATION ZONE: FEET
LOCATION ZONE: FINGER
LOCATION ZONE: FACE
LOCATION ZONE: TRUNK

## 2022-11-29 ASSESSMENT — LOCATION DETAILED DESCRIPTION DERM
LOCATION DETAILED: LEFT SUPERIOR HELIX
LOCATION DETAILED: RIGHT DISTAL DORSAL INDEX FINGER
LOCATION DETAILED: LEFT POSTERIOR ANKLE
LOCATION DETAILED: NASAL DORSUM
LOCATION DETAILED: RIGHT CENTRAL TEMPLE
LOCATION DETAILED: RIGHT MEDIAL DISTAL PRETIBIAL REGION
LOCATION DETAILED: RIGHT DORSAL FOOT
LOCATION DETAILED: LEFT FOREHEAD
LOCATION DETAILED: RIGHT INFERIOR HELIX
LOCATION DETAILED: LEFT SUPERIOR MEDIAL LOWER BACK
LOCATION DETAILED: RIGHT SUPERIOR MEDIAL MIDBACK
LOCATION DETAILED: LEFT ACHILLES SKIN

## 2022-11-29 NOTE — PROCEDURE: LIQUID NITROGEN
Add 52 Modifier (Optional): no
Show Applicator Variable?: Yes
Post-Care Instructions: - Patient was instructed to avoid picking at any of the treated lesions.
Consent: - Discussed that these are a result of cumulative sun exposure.\\n- Verbal and written consent was obtained, and risks were reviewed prior to procedure today. \\n- Risks discussed include but are not limited to pain, crusting, scabbing, blistering, scarring, temporary or permanent darker or lighter pigmentary change, recurrence, incomplete resolution, and infection.
Spray Paint Text: The liquid nitrogen was applied to the skin utilizing a spray paint frosting technique.
Detail Level: Detailed
Consent: - Verbal and written consent was obtained, and risks were reviewed prior to procedure today. \\n- Risks discussed include but are not limited to pain, crusting, scabbing, blistering, scarring, temporary or permanent darker or lighter pigmentary change, recurrence, incomplete resolution, and infection.
Post-Care Instructions: - Avoid picking at any of the treated lesions.
Medical Necessity Information: It is in your best interest to select a reason for this procedure from the list below. All of these items fulfill various CMS LCD requirements except the new and changing color options.
Application Tool (Optional): Liquid Nitrogen Sprayer
Medical Necessity Clause: This procedure was medically necessary because the lesions that were treated were:

## 2022-11-29 NOTE — PROCEDURE: COUNSELING
Detail Level: Detailed
Patient Specific Counseling (Will Not Stick From Patient To Patient): - History of eczema in this area. She does have a topical steroid cream, TMC 0.1% cream, to use as needed for itching.\\n- Had a foot surgery in last couple months which ended up in an infection.
Skin Checks Recommendations: Rec FBE
Detail Level: Simple

## 2023-04-21 ENCOUNTER — APPOINTMENT (OUTPATIENT)
Dept: URBAN - METROPOLITAN AREA CLINIC 259 | Age: 63
Setting detail: DERMATOLOGY
End: 2023-05-01

## 2023-04-21 DIAGNOSIS — Z85.828 PERSONAL HISTORY OF OTHER MALIGNANT NEOPLASM OF SKIN: ICD-10-CM

## 2023-04-21 DIAGNOSIS — D49.2 NEOPLASM OF UNSPECIFIED BEHAVIOR OF BONE, SOFT TISSUE, AND SKIN: ICD-10-CM

## 2023-04-21 PROCEDURE — OTHER COUNSELING: OTHER

## 2023-04-21 PROCEDURE — 99212 OFFICE O/P EST SF 10 MIN: CPT | Mod: 25

## 2023-04-21 PROCEDURE — OTHER BIOPSY BY SHAVE METHOD: OTHER

## 2023-04-21 PROCEDURE — 11102 TANGNTL BX SKIN SINGLE LES: CPT

## 2023-04-21 PROCEDURE — OTHER MIPS QUALITY: OTHER

## 2023-04-21 ASSESSMENT — LOCATION ZONE DERM
LOCATION ZONE: FACE
LOCATION ZONE: NOSE

## 2023-04-21 ASSESSMENT — LOCATION DETAILED DESCRIPTION DERM
LOCATION DETAILED: RIGHT MEDIAL MALAR CHEEK
LOCATION DETAILED: NASAL DORSUM

## 2023-04-21 ASSESSMENT — LOCATION SIMPLE DESCRIPTION DERM
LOCATION SIMPLE: RIGHT CHEEK
LOCATION SIMPLE: NOSE

## 2023-04-21 NOTE — PROCEDURE: BIOPSY BY SHAVE METHOD
Hide Topical Anesthesia?: No
Hemostasis: Drysol
X Size Of Lesion In Cm: 0
Biopsy Type: H and E
Silver Nitrate Text: The wound bed was treated with silver nitrate after the biopsy was performed.
Type Of Destruction Used: Curettage
Post-Care Instructions: I reviewed with the patient in detail post-care instructions. Patient is to keep the biopsy site dry overnight, and then apply bacitracin twice daily until healed. Patient may apply hydrogen peroxide soaks to remove any crusting.
Anesthesia Volume In Cc (Will Not Render If 0): 0.5
Was A Bandage Applied: Yes
Electrodesiccation Text: The wound bed was treated with electrodesiccation after the biopsy was performed.
Notification Instructions: Patient will be notified of biopsy results. However, patient instructed to call the office if not contacted within 2 weeks.
Dressing: bandage
Anesthesia Type: 1% lidocaine with epinephrine
Size Of Lesion In Cm: 0.6
Billing Type: Third-Party Bill
Cryotherapy Text: The wound bed was treated with cryotherapy after the biopsy was performed.
Information: Selecting Yes will display possible errors in your note based on the variables you have selected. This validation is only offered as a suggestion for you. PLEASE NOTE THAT THE VALIDATION TEXT WILL BE REMOVED WHEN YOU FINALIZE YOUR NOTE. IF YOU WANT TO FAX A PRELIMINARY NOTE YOU WILL NEED TO TOGGLE THIS TO 'NO' IF YOU DO NOT WANT IT IN YOUR FAXED NOTE.
Depth Of Biopsy: dermis
Detail Level: Detailed
Consent: Written consent was obtained and risks were reviewed including but not limited to scarring, infection, bleeding, scabbing, incomplete removal, nerve damage and allergy to anesthesia.
Wound Care: Petrolatum
Electrodesiccation And Curettage Text: The wound bed was treated with electrodesiccation and curettage after the biopsy was performed.
Curettage Text: The wound bed was treated with curettage after the biopsy was performed.
Biopsy Method: Dermablade

## 2023-04-21 NOTE — HPI: SKIN LESION
SUBJECTIVE:  This patient is a 58 year old female who presents t today with a lump/tender area on her right anterior superior iliac spine region.  Patient feels like it was secondary to potential scar tissue but no see feels something there it does move it times is tender she pushes on it hard to sleep on it.  Not in the lateral portion of the greater trochanter.  Patient denies any other issues is feeling well otherwise does have a history of breast cancer.  Patient feels like it is tender in at times may put pressure on the nerve there will radiate down her leg.  Patient has tried over-the-counter medications 10s unit ice heat without much benefit.    REVIEW OF SYSTEMS:  No fever    ALLERGIES:  ALLERGIES:  Adhesive   (environmental), Guaifenesin, and Sudafed    MEDICATIONS:    Current Outpatient Medications   Medication   • triamcinolone (KENALOG) 0.5 % ointment   • ibuprofen (MOTRIN) 200 MG tablet   • naproxen sodium (ALEVE) 220 MG tablet   • Multiple Vitamin (MULTI VITAMIN PO)     No current facility-administered medications for this visit.     Facility-Administered Medications Ordered in Other Visits   Medication   • iopamidol (ISOVUE-250) 51 % injection 50 mL       PROBLEM LIST:  Patient Active Problem List   Diagnosis   • Disorders of bursae and tendons in shoulder region, unspecified   • RUPTURE ACHILLES TENDON, LT - DOI 10/8/07   • Aftercare following surgery of the musculoskeletal system, NEC       SOCIAL HISTORY:  Social History     Tobacco Use   • Smoking status: Never Smoker   • Smokeless tobacco: Never Used   Substance Use Topics   • Alcohol use: Yes     Alcohol/week: 1.0 - 2.0 standard drink     Types: 1 - 2 Glasses of wine per week     Comment: socially       OBJECTIVE:   Blood pressure 121/64, pulse 87, weight 87.4 kg (192 lb 9.6 oz), last menstrual period 10/01/2018.  GENERAL:  Alert and oriented times three, no acute distress, well-nourished, well-developed female appearing the stated 
What Type Of Note Output Would You Prefer (Optional)?: Bullet Format
How Severe Is Your Skin Lesion?: mild
age.  Ext-area of concern over the right anterior superior iliac spine region there is fullness present there is somewhat of a ill-defined subcutaneous lump does move is tender perhaps 1 1/2 cm    ASSESSMENT/PLAN:  Chronic right hip pain  (primary encounter diagnosis)  Localized skin mass, lump, or swelling   Will obtain x-ray and ultrasound of the area in question.  Patient is interested in seeing surgery for evaluation and possible removal.  Consult placed.    
Has Your Skin Lesion Been Treated?: not been treated
Is This A New Presentation, Or A Follow-Up?: Skin Lesion
Additional History: Patient has no other lesions of concern today.
Which Family Member (Optional)?: Brother

## 2023-05-08 ENCOUNTER — APPOINTMENT (OUTPATIENT)
Dept: URBAN - METROPOLITAN AREA CLINIC 259 | Age: 63
Setting detail: DERMATOLOGY
End: 2023-05-10

## 2023-05-08 DIAGNOSIS — D49.2 NEOPLASM OF UNSPECIFIED BEHAVIOR OF BONE, SOFT TISSUE, AND SKIN: ICD-10-CM

## 2023-05-08 PROBLEM — D04.39 CARCINOMA IN SITU OF SKIN OF OTHER PARTS OF FACE: Status: ACTIVE | Noted: 2023-05-08

## 2023-05-08 PROCEDURE — OTHER MIPS QUALITY: OTHER

## 2023-05-08 PROCEDURE — 17311 MOHS 1 STAGE H/N/HF/G: CPT

## 2023-05-08 PROCEDURE — 11102 TANGNTL BX SKIN SINGLE LES: CPT | Mod: 59

## 2023-05-08 PROCEDURE — 13131 CMPLX RPR F/C/C/M/N/AX/G/H/F: CPT | Mod: 59

## 2023-05-08 PROCEDURE — OTHER BIOPSY BY SHAVE METHOD: OTHER

## 2023-05-08 PROCEDURE — OTHER MOHS SURGERY: OTHER

## 2023-05-08 ASSESSMENT — LOCATION DETAILED DESCRIPTION DERM: LOCATION DETAILED: LEFT CENTRAL MALAR CHEEK

## 2023-05-08 ASSESSMENT — LOCATION SIMPLE DESCRIPTION DERM: LOCATION SIMPLE: LEFT CHEEK

## 2023-05-08 ASSESSMENT — LOCATION ZONE DERM: LOCATION ZONE: FACE

## 2023-05-08 NOTE — PROCEDURE: MIPS QUALITY
Quality 226: Preventive Care And Screening: Tobacco Use: Screening And Cessation Intervention: Patient screened for tobacco use and is an ex/non-smoker
Quality 110: Preventive Care And Screening: Influenza Immunization: Influenza Immunization not Administered because Patient Refused.
Quality 143: Oncology: Medical And Radiation- Pain Intensity Quantified: Pain severity quantified, pain present
Quality 431: Preventive Care And Screening: Unhealthy Alcohol Use - Screening: Patient not identified as an unhealthy alcohol user when screened for unhealthy alcohol use using a systematic screening method
Quality 130: Documentation Of Current Medications In The Medical Record: Current Medications Documented
Detail Level: Detailed

## 2023-05-08 NOTE — PROCEDURE: BIOPSY BY SHAVE METHOD
Hide Additional Anticipated Plan?: No
Hemostasis: Aluminum Chloride and Electrocautery
Information: Selecting Yes will display possible errors in your note based on the variables you have selected. This validation is only offered as a suggestion for you. PLEASE NOTE THAT THE VALIDATION TEXT WILL BE REMOVED WHEN YOU FINALIZE YOUR NOTE. IF YOU WANT TO FAX A PRELIMINARY NOTE YOU WILL NEED TO TOGGLE THIS TO 'NO' IF YOU DO NOT WANT IT IN YOUR FAXED NOTE.
Consent: Written consent was obtained and risks were reviewed including but not limited to scarring, infection, bleeding, scabbing, incomplete removal, nerve damage and allergy to anesthesia.
Biopsy Type: H and E
Anesthesia Type: 1% lidocaine with epinephrine and a 1:10 solution of 8.4% sodium bicarbonate
Electrodesiccation And Curettage Text: The wound bed was treated with electrodesiccation and curettage after the biopsy was performed.
Dressing: bandage
Was A Bandage Applied: Yes
Body Location Override (Optional - Billing Will Still Be Based On Selected Body Map Location If Applicable): Left Superior Malar Cheek
Depth Of Biopsy: dermis
Biopsy Method: double edge Personna blade
Type Of Destruction Used: Curettage
Notification Instructions: Patient will be notified of biopsy results. However, patient instructed to call the office if not contacted within 2 weeks.
Wound Care: Petrolatum
Curettage Text: The wound bed was treated with curettage after the biopsy was performed.
Size Of Lesion In Cm: 0.3
Anesthesia Volume In Cc (Will Not Render If 0): 0.5
Path Notes (To The Dermatopathologist): Please check margins.
Additional Anesthesia Volume In Cc (Will Not Render If 0): 0
Post-Care Instructions: I reviewed with the patient in detail post-care instructions. Patient is to keep the biopsy site dry overnight, and then apply bacitracin twice daily until healed. Patient may apply hydrogen peroxide soaks to remove any crusting.
Detail Level: Detailed
Electrodesiccation Text: The wound bed was treated with electrodesiccation after the biopsy was performed.
Cryotherapy Text: The wound bed was treated with cryotherapy after the biopsy was performed.
Silver Nitrate Text: The wound bed was treated with silver nitrate after the biopsy was performed.
Billing Type: Client Bill

## 2023-05-18 ENCOUNTER — APPOINTMENT (OUTPATIENT)
Dept: URBAN - METROPOLITAN AREA CLINIC 254 | Age: 63
Setting detail: DERMATOLOGY
End: 2023-05-19

## 2023-05-18 DIAGNOSIS — Z86.007 PERSONAL HISTORY OF IN-SITU NEOPLASM OF SKIN: ICD-10-CM

## 2023-05-18 DIAGNOSIS — L57.0 ACTINIC KERATOSIS: ICD-10-CM

## 2023-05-18 DIAGNOSIS — L20.89 OTHER ATOPIC DERMATITIS: ICD-10-CM

## 2023-05-18 DIAGNOSIS — L82.1 OTHER SEBORRHEIC KERATOSIS: ICD-10-CM

## 2023-05-18 DIAGNOSIS — Z48.02 ENCOUNTER FOR REMOVAL OF SUTURES: ICD-10-CM

## 2023-05-18 DIAGNOSIS — Z71.89 OTHER SPECIFIED COUNSELING: ICD-10-CM

## 2023-05-18 PROBLEM — L20.84 INTRINSIC (ALLERGIC) ECZEMA: Status: ACTIVE | Noted: 2023-05-18

## 2023-05-18 PROCEDURE — 99214 OFFICE O/P EST MOD 30 MIN: CPT | Mod: 24

## 2023-05-18 PROCEDURE — OTHER COUNSELING: OTHER

## 2023-05-18 PROCEDURE — OTHER MIPS QUALITY: OTHER

## 2023-05-18 PROCEDURE — OTHER PATIENT SPECIFIC COUNSELING: OTHER

## 2023-05-18 PROCEDURE — OTHER PRESCRIPTION: OTHER

## 2023-05-18 ASSESSMENT — LOCATION DETAILED DESCRIPTION DERM
LOCATION DETAILED: RIGHT SUPERIOR MEDIAL MIDBACK
LOCATION DETAILED: RIGHT DORSAL FOOT
LOCATION DETAILED: LEFT CENTRAL MALAR CHEEK
LOCATION DETAILED: RIGHT DISTAL PRETIBIAL REGION
LOCATION DETAILED: LEFT DORSAL FOOT
LOCATION DETAILED: RIGHT ANKLE
LOCATION DETAILED: LEFT ANKLE

## 2023-05-18 ASSESSMENT — LOCATION SIMPLE DESCRIPTION DERM
LOCATION SIMPLE: RIGHT LOWER BACK
LOCATION SIMPLE: RIGHT ANKLE
LOCATION SIMPLE: LEFT FOOT
LOCATION SIMPLE: LEFT CHEEK
LOCATION SIMPLE: LEFT ANKLE
LOCATION SIMPLE: RIGHT FOOT
LOCATION SIMPLE: RIGHT PRETIBIAL REGION

## 2023-05-18 ASSESSMENT — ITCH NUMERIC RATING SCALE: HOW SEVERE IS YOUR ITCHING?: 2

## 2023-05-18 ASSESSMENT — BSA RASH: BSA RASH: 2

## 2023-05-18 ASSESSMENT — LOCATION ZONE DERM
LOCATION ZONE: LEG
LOCATION ZONE: FACE
LOCATION ZONE: TRUNK
LOCATION ZONE: FEET

## 2023-05-18 ASSESSMENT — SEVERITY ASSESSMENT 2020: SEVERITY 2020: MILD

## 2023-05-18 NOTE — PROCEDURE: PATIENT SPECIFIC COUNSELING
Detail Level: Zone
Spoke to patient about her biopsy results of 5/8/23, patient is aware that it is benign. The spot still needs to heal.
Patient was told she can start showering and swimming with her band aid off. In a week she can start apply Vitamin E oil to help the skin.
Detail Level: Simple
Notified patient that next visit we will discuss starting Efudex cream. We discussed opting out on Nicotinamide B3 as she has an allergy to Niacin.

## 2023-05-19 RX ORDER — TRIAMCINOLONE ACETONIDE 1 MG/G
CREAM TOPICAL BID
Qty: 30 | Refills: 1 | Status: ERX

## 2023-07-13 NOTE — PROCEDURE: MOHS SURGERY
Acitretin Counseling:  I discussed with the patient the risks of acitretin including but not limited to hair loss, dry lips/skin/eyes, liver damage, hyperlipidemia, depression/suicidal ideation, photosensitivity.  Serious rare side effects can include but are not limited to pancreatitis, pseudotumor cerebri, bony changes, clot formation/stroke/heart attack.  Patient understands that alcohol is contraindicated since it can result in liver toxicity and significantly prolong the elimination of the drug by many years. Eye Protection Verbiage: Before proceeding with the stage, a plastic scleral shield was inserted. The globe was anesthetized with a few drops of 1% lidocaine with 1:100,000 epinephrine. Then, an appropriate sized scleral shield was chosen and coated with lacrilube ointment. The shield was gently inserted and left in place for the duration of each stage. After the stage was completed, the shield was gently removed.

## 2023-07-23 NOTE — PROCEDURE: COUNSELING
Anesthesia Post Evaluation    Patient: Elizabeth Lou    
Patient Specific Counseling (Will Not Stick From Patient To Patient): - Recommended liquid nitrogen cryosurgery and application of Canthacur.\\n- Discussed the expectations of blistering and pain with Canthacur.\\n- Advised that the Canthacur plaster will need to be washed off with soap and water after 5 hours.\\n- \\n- increased canthacur time to 5 hours
Detail Level: Detailed

## 2023-08-19 ENCOUNTER — HEALTH MAINTENANCE LETTER (OUTPATIENT)
Age: 63
End: 2023-08-19

## 2023-10-02 ENCOUNTER — ANCILLARY PROCEDURE (OUTPATIENT)
Dept: ULTRASOUND IMAGING | Facility: CLINIC | Age: 63
End: 2023-10-02
Attending: PHYSICIAN ASSISTANT
Payer: COMMERCIAL

## 2023-10-02 DIAGNOSIS — E04.1 THYROID NODULE: ICD-10-CM

## 2023-10-02 PROCEDURE — 76536 US EXAM OF HEAD AND NECK: CPT | Performed by: RADIOLOGY

## 2023-11-14 ENCOUNTER — APPOINTMENT (OUTPATIENT)
Dept: URBAN - METROPOLITAN AREA CLINIC 254 | Age: 63
Setting detail: DERMATOLOGY
End: 2023-11-16

## 2023-11-14 VITALS — WEIGHT: 275 LBS | HEIGHT: 64 IN

## 2023-11-14 DIAGNOSIS — L20.89 OTHER ATOPIC DERMATITIS: ICD-10-CM

## 2023-11-14 DIAGNOSIS — Z85.828 PERSONAL HISTORY OF OTHER MALIGNANT NEOPLASM OF SKIN: ICD-10-CM

## 2023-11-14 DIAGNOSIS — L30.4 ERYTHEMA INTERTRIGO: ICD-10-CM

## 2023-11-14 DIAGNOSIS — D22 MELANOCYTIC NEVI: ICD-10-CM

## 2023-11-14 DIAGNOSIS — L57.8 OTHER SKIN CHANGES DUE TO CHRONIC EXPOSURE TO NONIONIZING RADIATION: ICD-10-CM

## 2023-11-14 DIAGNOSIS — Z71.89 OTHER SPECIFIED COUNSELING: ICD-10-CM

## 2023-11-14 DIAGNOSIS — L81.4 OTHER MELANIN HYPERPIGMENTATION: ICD-10-CM

## 2023-11-14 DIAGNOSIS — Z86.007 PERSONAL HISTORY OF IN-SITU NEOPLASM OF SKIN: ICD-10-CM

## 2023-11-14 DIAGNOSIS — D18.0 HEMANGIOMA: ICD-10-CM

## 2023-11-14 PROBLEM — D18.01 HEMANGIOMA OF SKIN AND SUBCUTANEOUS TISSUE: Status: ACTIVE | Noted: 2023-11-14

## 2023-11-14 PROBLEM — D22.5 MELANOCYTIC NEVI OF TRUNK: Status: ACTIVE | Noted: 2023-11-14

## 2023-11-14 PROCEDURE — OTHER PRESCRIPTION: OTHER

## 2023-11-14 PROCEDURE — OTHER COUNSELING: OTHER

## 2023-11-14 PROCEDURE — OTHER MIPS QUALITY: OTHER

## 2023-11-14 PROCEDURE — 99214 OFFICE O/P EST MOD 30 MIN: CPT

## 2023-11-14 PROCEDURE — OTHER ADDITIONAL NOTES: OTHER

## 2023-11-14 RX ORDER — KETOCONAZOLE 20 MG/G
2% CREAM TOPICAL BID
Qty: 60 | Refills: 2 | Status: ERX | COMMUNITY
Start: 2023-11-14

## 2023-11-14 RX ORDER — HYDROCORTISONE 25 MG/G
2.5% CREAM TOPICAL TWICE DAILY
Qty: 60 | Refills: 2 | Status: ERX | COMMUNITY
Start: 2023-11-14

## 2023-11-14 RX ORDER — BETAMETHASONE DIPROPIONATE 0.5 MG/G
0.05% CREAM TOPICAL BID PRN
Qty: 45 | Refills: 2 | Status: ERX | COMMUNITY
Start: 2023-11-14

## 2023-11-14 ASSESSMENT — LOCATION SIMPLE DESCRIPTION DERM
LOCATION SIMPLE: LEFT UPPER BACK
LOCATION SIMPLE: GROIN
LOCATION SIMPLE: RIGHT CHEEK
LOCATION SIMPLE: RIGHT FOOT
LOCATION SIMPLE: NOSE
LOCATION SIMPLE: LEFT FOOT

## 2023-11-14 ASSESSMENT — LOCATION DETAILED DESCRIPTION DERM
LOCATION DETAILED: LEFT MEDIAL UPPER BACK
LOCATION DETAILED: RIGHT CENTRAL MALAR CHEEK
LOCATION DETAILED: RIGHT DORSAL FOOT
LOCATION DETAILED: NASAL DORSUM
LOCATION DETAILED: LEFT SUPERIOR MEDIAL UPPER BACK
LOCATION DETAILED: RIGHT SUPRAPUBIC SKIN
LOCATION DETAILED: LEFT DORSAL FOOT

## 2023-11-14 ASSESSMENT — LOCATION ZONE DERM
LOCATION ZONE: NOSE
LOCATION ZONE: FACE
LOCATION ZONE: TRUNK
LOCATION ZONE: FEET

## 2023-11-14 NOTE — PROCEDURE: ADDITIONAL NOTES
Render Risk Assessment In Note?: no
Detail Level: Simple
Additional Notes: - Pt is allergic to Niacinamide, therefore will opt out in not taking the supplements.
Additional Notes: - Pt to stop TAC and transition to betamethasone bid x2 weeks/month.

## 2023-11-14 NOTE — HPI: FULL BODY SKIN EXAMINATION
What Type Of Note Output Would You Prefer (Optional)?: Standard Output
What Is The Reason For Today's Visit?: Full Body Skin Examination
What Is The Reason For Today's Visit? (Being Monitored For X): concerning skin lesions on an annual basis
Additional History: Concerning lesion on the inframammary that has been present for years, enlarging.

## 2024-03-04 NOTE — TELEPHONE ENCOUNTER
FUTURE VISIT INFORMATION      FUTURE VISIT INFORMATION:  Date: 3/19/24  Time: 10:00am  Location: [facility] – [place], [place]  REFERRAL INFORMATION:  Referring providers clinic:  self referred  Reason for visit/diagnosis  Alesha Norman (Panniculectomy)     RECORDS REQUESTED FROM:       No recs to collect

## 2024-03-05 ENCOUNTER — MEDICAL CORRESPONDENCE (OUTPATIENT)
Dept: HEALTH INFORMATION MANAGEMENT | Facility: CLINIC | Age: 64
End: 2024-03-05
Payer: COMMERCIAL

## 2024-03-07 ENCOUNTER — TRANSCRIBE ORDERS (OUTPATIENT)
Dept: OTHER | Age: 64
End: 2024-03-07

## 2024-03-07 DIAGNOSIS — E65 ABDOMINAL PANNICULUS: Primary | ICD-10-CM

## 2024-03-07 DIAGNOSIS — M62.08 DIASTASIS RECTI: ICD-10-CM

## 2024-03-12 ENCOUNTER — APPOINTMENT (OUTPATIENT)
Dept: URBAN - METROPOLITAN AREA CLINIC 254 | Age: 64
Setting detail: DERMATOLOGY
End: 2024-03-13

## 2024-03-12 VITALS — HEIGHT: 64 IN | WEIGHT: 273 LBS

## 2024-03-12 DIAGNOSIS — L82.1 OTHER SEBORRHEIC KERATOSIS: ICD-10-CM

## 2024-03-12 DIAGNOSIS — L82.0 INFLAMED SEBORRHEIC KERATOSIS: ICD-10-CM

## 2024-03-12 DIAGNOSIS — L57.8 OTHER SKIN CHANGES DUE TO CHRONIC EXPOSURE TO NONIONIZING RADIATION: ICD-10-CM

## 2024-03-12 PROCEDURE — 99213 OFFICE O/P EST LOW 20 MIN: CPT | Mod: 25

## 2024-03-12 PROCEDURE — OTHER LIQUID NITROGEN: OTHER

## 2024-03-12 PROCEDURE — OTHER MIPS QUALITY: OTHER

## 2024-03-12 PROCEDURE — OTHER COUNSELING: OTHER

## 2024-03-12 PROCEDURE — 17110 DESTRUCT B9 LESION 1-14: CPT

## 2024-03-12 ASSESSMENT — LOCATION SIMPLE DESCRIPTION DERM
LOCATION SIMPLE: LEFT FOREHEAD
LOCATION SIMPLE: RIGHT TEMPLE
LOCATION SIMPLE: NOSE
LOCATION SIMPLE: LEFT CLAVICULAR SKIN

## 2024-03-12 ASSESSMENT — LOCATION ZONE DERM
LOCATION ZONE: TRUNK
LOCATION ZONE: NOSE
LOCATION ZONE: FACE

## 2024-03-12 ASSESSMENT — LOCATION DETAILED DESCRIPTION DERM
LOCATION DETAILED: LEFT CLAVICULAR SKIN
LOCATION DETAILED: RIGHT LATERAL TEMPLE
LOCATION DETAILED: NASAL DORSUM
LOCATION DETAILED: LEFT SUPERIOR MEDIAL FOREHEAD

## 2024-03-12 NOTE — HPI: SKIN LESION
What Type Of Note Output Would You Prefer (Optional)?: Bullet Format
How Severe Is Your Skin Lesion?: mild
Has Your Skin Lesion Been Treated?: not been treated
Is This A New Presentation, Or A Follow-Up?: Growth
Additional History: Patient reports growth of possible lesion on right side of temple, concerned of possible growth of NMSC. No OTC treatment routes were taken.

## 2024-03-18 NOTE — PROGRESS NOTES
PLASTIC SURGERY HISTORY AND PHYSICAL    Chief Complaint: panniculus, excess skin  Referring Provider: Referred Self      HPI:   Kyung is a 63 year old female PMH remote history DVT/PE on xarelto, prediabetes, hypothyroidism, PCOS, fibromyalgia, MO who presents with large panniculus with frequent rash/infection desiring skin removal.     Complains of large, heavy panniculus and frequent rashes for >10 years. Has seen PCP and derm for the rashes in the past. Has had creams prescribed in the past and uses them when the rashes get bad. Complains of sweating under panniculus, changes underwear multiple times per day due to moisture under skin flap. Feels that excess skin affects her mobility. Presents today because her friend has this procedure with Dr. Kaplan and was very happy with results.     Heaviest 350lbs, current weight 275lbs has been stable about 1 year. Has done weight watchers in the past and other weight programs in the past and they haven't worked long term. Does spin class 3days/week, gets 10,000 steps daily.     Has bilateral lymphedema, uses leggings and leg pumps. Has tried compression garments in the past but doesn't wear them all the time. Has fibromyalgia. Not on pain meds. Takes advil/aleve sparingly as needed for pain.     DVT/PE- May 1988 DVT/PE. 1992 DVT. On anticoagulation for life, current on xarelto which she tolerates well. Has appt to see internal medicine doctor to see if she needs more of a workup.     History of uterine cancer with complete hysterectomy, low vertical midline abdominal incision. Had incisional hernia at this site and had hernia repair with mesh in 2001. No known issues with hernia since this. Did have diagnosis of diastasis recti via CT.     Gout  Hypothyroidism  Pre-diabetic  PCOS  No HTN, doesn't take meds  Uterine cancer in 1995    For antibiotics has tolerated clinda and zpak in the past    PMH:   Past Medical History:   Diagnosis Date    Acute idiopathic gout of  left ankle     Antiplatelet or antithrombotic long-term use     Asthma     mild, intermittent    Chronic anticoagulation     Diabetes (H)     I am pre-diabetic    Hyperlipidemia     Hypertension     Hypothyroidism     Insulin resistance     Left leg DVT (H)     1988, 1992    Morbid obesity (H)     PCOS (polycystic ovarian syndrome)     PE (pulmonary embolism)     Prediabetes     Primary osteoarthritis of both knees     Right leg DVT (H)     1989 and 1992    Stress incontinence     Uterine cancer (H)        PSH:   Past Surgical History:   Procedure Laterality Date    ABDOMEN SURGERY  8/10/1995    ARTHROSCOPY SHOULDER DISTAL CLAVICLE REPAIR  08/19/2011    ARTHROSCOPY SHOULDER ROTATOR CUFF REPAIR  08/19/2011    ARTHROSCOPY SHOULDER ROTATOR CUFF REPAIR Right 08/24/2016    BLEPHAROPLASTY BILATERAL      BREAST SURGERY      GYN SURGERY      HERNIA REPAIR      HYSTERECTOMY      ORTHOPEDIC SURGERY      PHACOEMULSIFICATION CLEAR CORNEA WITH STANDARD INTRAOCULAR LENS IMPLANT Right 03/14/2022    PHACOEMULSIFICATION CLEAR CORNEA WITH TORIC INTRAOCULAR LENS IMPLANT  10/23/2012    SINUS SURGERY      TONSILLECTOMY       FH:   Family History   Problem Relation Age of Onset    Other Cancer Sister         Ovarian cancer     SH:   Social History     Tobacco Use    Smoking status: Never    Smokeless tobacco: Never   Substance Use Topics    Alcohol use: Yes     Comment: minimal    Drug use: No      Work/school: retired    MEDS:     Current Outpatient Medications:     albuterol 90 MCG/ACT inhaler, Inhale 2 puffs into the lungs every 6 hours as needed., Disp: , Rfl:     calcium carbonate 500 mg, elemental, (OSCAL 500) 1250 (500 Ca) MG TABS tablet, Take by mouth daily, Disp: , Rfl:     Calcium Carbonate-Vitamin D (CALCIUM 500 + D PO), Take 1 tablet by mouth 2 times daily., Disp: , Rfl:     colchicine (COLCYRS) 0.6 MG tablet, Take 0.6 mg by mouth daily, Disp: , Rfl:     dorzolamide-timolol (COSOPT) 2-0.5 % ophthalmic solution, 1 drop 2 times  "daily, Disp: , Rfl:     levothyroxine (SYNTHROID) 137 MCG tablet, Take 137 mcg by mouth daily, Disp: , Rfl:     MetFORmin (GLUCOPHAGE) 850 MG tablet, Take 850 mg by mouth 2 times daily (with meals)., Disp: , Rfl:     OZEMPIC, 2 MG/DOSE, 8 MG/3ML pen, , Disp: , Rfl:     rivaroxaban ANTICOAGULANT (XARELTO) 10 MG TABS tablet, Take by mouth daily (with dinner), Disp: , Rfl:     travoprost GERMAN FREE (TRAVATAN Z) 0.004 % ophthalmic solution, Place 1 drop into both eyes at bedtime, Disp: , Rfl:     valACYclovir (VALTREX) 500 MG tablet, , Disp: , Rfl:        ALLERGIES:     Allergies   Allergen Reactions    Dilaudid [Hydromorphone Hcl] Anaphylaxis    Allopurinol Hives    Atorvastatin Unknown    Atorvastatin Calcium Muscle Pain (Myalgia), Diarrhea and GI Disturbance    Codeine Sulfate Unknown    Colesevelam     Enoxaparin Hives and Difficulty breathing     Not allergic to name brand lovenox. Had reactions to generic enoxaparin.    Imitrex [Sumatriptan Succinate] Other (See Comments)     dizzyness      Niacin Hives    No Clinical Screening - See Comments Other (See Comments)     Perfumes and fragrance  Causes wheezing and chest heaviness    Penicillins Unknown    Pravachol [Pravastatin Sodium] Muscle Pain (Myalgia), Diarrhea and GI Disturbance    Simvastatin     Sulfa Antibiotics Unknown    Sulfasalazine     Sumatriptan      Dizzy and shakey    Welchol [Colesevelam Hcl] Muscle Pain (Myalgia), Diarrhea and GI Disturbance        ROS: +dvt/pe Denies chest pain, shortness of breath, MI, CVA     PHYSICAL EXAMINATION:   BP (!) 151/74 (BP Location: Left arm, Patient Position: Sitting, Cuff Size: Adult Large)   Pulse 78   Ht 1.626 m (5' 4\")   Wt 126.7 kg (279 lb 4.8 oz)   SpO2 97%   BMI 47.94 kg/m     BMI: Body mass index is 47.94 kg/m .  General: No acute distress.   Abdomen: grade II-III panniculus, covers mons and hangs to upper thigh. Low midline vertical scar well healed, mild contraction. No gross hernia, some scarring " palpable. + intertrigo rash. Upper abdomen with subcutaneous fullness.     CT ABDOMEN PELVIS W CONTRAST  [facility] 4/2016  Impression    IMPRESSION:  1.  Postop change along the anterior abdominal wall.  2.  Tiny fat-containing midline protrusion at the upper aspect of midline incision.  3.  Diastases of the upper rectus abdominis.  Narrative    EXAM:  CT ABDOMEN & PELVIS W CONTRAST  4/4/2016 4:39 PM  COMPARISON: None    TECHNIQUE: Thin-section contiguous transaxial images were obtained through the abdomen and pelvis with oral and intravenous contrast.  Coronal reformations were also obtained through the abdomen and pelvis.  A total of 100 cc of Omnipaque 350 were administered intravenously for this study.    FINDINGS:  Lung Bases: Minimal basilar atelectasis.  Solid Organs: No acute solid organ abnormality.  Biliary System: Gallbladder and bile ducts without acute pathology.  Bowel: Small and large bowel without obstruction or localized inflammatory process. Appendix: No pericecal inflammation.  Osseous Structures: No acute bony abnormality.  Postoperative changes anterior midline hernia repair. Tiny fat-containing protrusion at the upper aspect of midline incision.. Stranding along the anterior abdominal fascia. Hysterectomy noted.    There is diastases of the upper rectus abdominis.     ASSESSMENT:   Kyung is a 63 year old female PMH remote history DVT/PE on xarelto for life, prediabetes, hypothyroidism, PCOS, fibromyalgia, MO who presents with large panniculus with frequent rash/infection desiring skin removal.      PLAN: We had a thorough discussion about the procedure today. I explained what a panniculectomy consists of and what it does not. I explained the difference between a panniculectomy and an abdominoplasty. I explained the risks to include bleeding, infection, injury to surrounding structures, fluid collection, wound healing difficulties, contour deformity, dog ears, asymmetry, loss of  umbilicus, inability to remove all excess tissue, and DVT/PE. she is interested in panniculectomy only. I think the procedure would help with her significant excess skin and frequent rashes. For Kyung we need to make sure she is safe to undergo this procedure, high risk for DVT/PE with her history and wound healing issues with her BMI. We discussed this and she understands.     -Needs maia op anticoagulation plan. When to start/stop xarelto. DVT/PE risk off meds. Bridging needed?   -Will contact us once she has talked to PCP and have a plan in writing in regards to DVT/PE risk and periop anticoagulation. Can enter orders or be seen in clinic with Dr. Kaplan at that time if they would like to meet prior to planning surgery. Prefer she be seen in clinic with Dr. Kaplan prior to scheduling. CT from 2016 at [facility] reviewed after she left clinic, small fat containing hernia noted but unable to see images.     Perla Bustillos PA-C  Plastic and Reconstructive Surgery    65 minutes spent on the date of the encounter doing chart review, history and physical, dressing changes, documentation and further activity as noted above.

## 2024-03-19 ENCOUNTER — OFFICE VISIT (OUTPATIENT)
Dept: PLASTIC SURGERY | Facility: CLINIC | Age: 64
End: 2024-03-19
Payer: COMMERCIAL

## 2024-03-19 ENCOUNTER — PRE VISIT (OUTPATIENT)
Dept: PLASTIC SURGERY | Facility: CLINIC | Age: 64
End: 2024-03-19

## 2024-03-19 VITALS
HEIGHT: 64 IN | HEART RATE: 78 BPM | SYSTOLIC BLOOD PRESSURE: 151 MMHG | OXYGEN SATURATION: 97 % | BODY MASS INDEX: 47.68 KG/M2 | WEIGHT: 279.3 LBS | DIASTOLIC BLOOD PRESSURE: 74 MMHG

## 2024-03-19 DIAGNOSIS — E65 ABDOMINAL PANNICULUS: Primary | ICD-10-CM

## 2024-03-19 DIAGNOSIS — M62.08 DIASTASIS RECTI: ICD-10-CM

## 2024-03-19 DIAGNOSIS — M79.3 PANNICULITIS: ICD-10-CM

## 2024-03-19 PROCEDURE — 99205 OFFICE O/P NEW HI 60 MIN: CPT | Performed by: PHYSICIAN ASSISTANT

## 2024-03-19 RX ORDER — SEMAGLUTIDE 2.68 MG/ML
INJECTION, SOLUTION SUBCUTANEOUS
COMMUNITY

## 2024-03-19 RX ORDER — TRAVOPROST OPHTHALMIC SOLUTION 0.04 MG/ML
1 SOLUTION OPHTHALMIC AT BEDTIME
COMMUNITY

## 2024-03-19 ASSESSMENT — PAIN SCALES - GENERAL: PAINLEVEL: MODERATE PAIN (5)

## 2024-03-19 NOTE — LETTER
3/19/2024       RE: Kyung Adair  [address] [place] [address]       Dear Colleague,    Thank you for referring your patient, Kyung Adair, to the [facility] [place] at [facility]. Please see a copy of my visit note below.    PLASTIC SURGERY HISTORY AND PHYSICAL    Chief Complaint: panniculus, excess skin  Referring Provider: Referred Self      HPI:   Kyung is a 63 year old female PMH remote history DVT/PE on xarelto, prediabetes, hypothyroidism, PCOS, fibromyalgia, MO who presents with large panniculus with frequent rash/infection desiring skin removal.     Complains of large, heavy panniculus and frequent rashes for >10 years. Has seen PCP and derm for the rashes in the past. Has had creams prescribed in the past and uses them when the rashes get bad. Complains of sweating under panniculus, changes underwear multiple times per day due to moisture under skin flap. Feels that excess skin affects her mobility. Presents today because her friend has this procedure with Dr. Kaplan and was very happy with results.     Heaviest 350lbs, current weight 275lbs has been stable about 1 year. Has done weight watchers in the past and other weight programs in the past and they haven't worked long term. Does spin class 3days/week, gets 10,000 steps daily.     Has bilateral lymphedema, uses leggings and leg pumps. Has tried compression garments in the past but doesn't wear them all the time. Has fibromyalgia. Not on pain meds. Takes advil/aleve sparingly as needed for pain.     DVT/PE- May 1988 DVT/PE. 1992 DVT. On anticoagulation for life, current on xarelto which she tolerates well. Has appt to see internal medicine doctor to see if she needs more of a workup.     History of uterine cancer with complete hysterectomy, low vertical midline abdominal incision. Had incisional hernia at this site and had hernia  repair with mesh in 2001. No known issues with hernia since this. Did have diagnosis of diastasis recti via CT.     Gout  Hypothyroidism  Pre-diabetic  PCOS  No HTN, doesn't take meds  Uterine cancer in 1995    For antibiotics has tolerated clinda and zpak in the past    PMH:   Past Medical History:   Diagnosis Date    Acute idiopathic gout of left ankle     Antiplatelet or antithrombotic long-term use     Asthma     mild, intermittent    Chronic anticoagulation     Diabetes (H)     I am pre-diabetic    Hyperlipidemia     Hypertension     Hypothyroidism     Insulin resistance     Left leg DVT (H)     1988, 1992    Morbid obesity (H)     PCOS (polycystic ovarian syndrome)     PE (pulmonary embolism)     Prediabetes     Primary osteoarthritis of both knees     Right leg DVT (H)     1989 and 1992    Stress incontinence     Uterine cancer (H)        PSH:   Past Surgical History:   Procedure Laterality Date    ABDOMEN SURGERY  8/10/1995    ARTHROSCOPY SHOULDER DISTAL CLAVICLE REPAIR  08/19/2011    ARTHROSCOPY SHOULDER ROTATOR CUFF REPAIR  08/19/2011    ARTHROSCOPY SHOULDER ROTATOR CUFF REPAIR Right 08/24/2016    BLEPHAROPLASTY BILATERAL      BREAST SURGERY      GYN SURGERY      HERNIA REPAIR      HYSTERECTOMY      ORTHOPEDIC SURGERY      PHACOEMULSIFICATION CLEAR CORNEA WITH STANDARD INTRAOCULAR LENS IMPLANT Right 03/14/2022    PHACOEMULSIFICATION CLEAR CORNEA WITH TORIC INTRAOCULAR LENS IMPLANT  10/23/2012    SINUS SURGERY      TONSILLECTOMY       FH:   Family History   Problem Relation Age of Onset    Other Cancer Sister         Ovarian cancer     SH:   Social History     Tobacco Use    Smoking status: Never    Smokeless tobacco: Never   Substance Use Topics    Alcohol use: Yes     Comment: minimal    Drug use: No      Work/school: retired    MEDS:     Current Outpatient Medications:     albuterol 90 MCG/ACT inhaler, Inhale 2 puffs into the lungs every 6 hours as needed., Disp: , Rfl:     calcium carbonate 500 mg,  elemental, (OSCAL 500) 1250 (500 Ca) MG TABS tablet, Take by mouth daily, Disp: , Rfl:     Calcium Carbonate-Vitamin D (CALCIUM 500 + D PO), Take 1 tablet by mouth 2 times daily., Disp: , Rfl:     colchicine (COLCYRS) 0.6 MG tablet, Take 0.6 mg by mouth daily, Disp: , Rfl:     dorzolamide-timolol (COSOPT) 2-0.5 % ophthalmic solution, 1 drop 2 times daily, Disp: , Rfl:     levothyroxine (SYNTHROID) 137 MCG tablet, Take 137 mcg by mouth daily, Disp: , Rfl:     MetFORmin (GLUCOPHAGE) 850 MG tablet, Take 850 mg by mouth 2 times daily (with meals)., Disp: , Rfl:     OZEMPIC, 2 MG/DOSE, 8 MG/3ML pen, , Disp: , Rfl:     rivaroxaban ANTICOAGULANT (XARELTO) 10 MG TABS tablet, Take by mouth daily (with dinner), Disp: , Rfl:     travoprost GERMAN FREE (TRAVATAN Z) 0.004 % ophthalmic solution, Place 1 drop into both eyes at bedtime, Disp: , Rfl:     valACYclovir (VALTREX) 500 MG tablet, , Disp: , Rfl:        ALLERGIES:     Allergies   Allergen Reactions    Dilaudid [Hydromorphone Hcl] Anaphylaxis    Allopurinol Hives    Atorvastatin Unknown    Atorvastatin Calcium Muscle Pain (Myalgia), Diarrhea and GI Disturbance    Codeine Sulfate Unknown    Colesevelam     Enoxaparin Hives and Difficulty breathing     Not allergic to name brand lovenox. Had reactions to generic enoxaparin.    Imitrex [Sumatriptan Succinate] Other (See Comments)     dizzyness      Niacin Hives    No Clinical Screening - See Comments Other (See Comments)     Perfumes and fragrance  Causes wheezing and chest heaviness    Penicillins Unknown    Pravachol [Pravastatin Sodium] Muscle Pain (Myalgia), Diarrhea and GI Disturbance    Simvastatin     Sulfa Antibiotics Unknown    Sulfasalazine     Sumatriptan      Dizzy and shakey    Welchol [Colesevelam Hcl] Muscle Pain (Myalgia), Diarrhea and GI Disturbance        ROS: +dvt/pe Denies chest pain, shortness of breath, MI, CVA     PHYSICAL EXAMINATION:   BP (!) 151/74 (BP Location: Left arm, Patient Position: Sitting, Cuff  "Size: Adult Large)   Pulse 78   Ht 1.626 m (5' 4\")   Wt 126.7 kg (279 lb 4.8 oz)   SpO2 97%   BMI 47.94 kg/m     BMI: Body mass index is 47.94 kg/m .  General: No acute distress.   Abdomen: grade II-III panniculus, covers mons and hangs to upper thigh. Low midline vertical scar well healed, mild contraction. No gross hernia, some scarring palpable. + intertrigo rash. Upper abdomen with subcutaneous fullness.     CT ABDOMEN PELVIS W CONTRAST  [facility] 4/2016  Impression    IMPRESSION:  1.  Postop change along the anterior abdominal wall.  2.  Tiny fat-containing midline protrusion at the upper aspect of midline incision.  3.  Diastases of the upper rectus abdominis.  Narrative    EXAM:  CT ABDOMEN & PELVIS W CONTRAST  4/4/2016 4:39 PM  COMPARISON: None    TECHNIQUE: Thin-section contiguous transaxial images were obtained through the abdomen and pelvis with oral and intravenous contrast.  Coronal reformations were also obtained through the abdomen and pelvis.  A total of 100 cc of Omnipaque 350 were administered intravenously for this study.    FINDINGS:  Lung Bases: Minimal basilar atelectasis.  Solid Organs: No acute solid organ abnormality.  Biliary System: Gallbladder and bile ducts without acute pathology.  Bowel: Small and large bowel without obstruction or localized inflammatory process. Appendix: No pericecal inflammation.  Osseous Structures: No acute bony abnormality.  Postoperative changes anterior midline hernia repair. Tiny fat-containing protrusion at the upper aspect of midline incision.. Stranding along the anterior abdominal fascia. Hysterectomy noted.    There is diastases of the upper rectus abdominis.     ASSESSMENT:   Kyung is a 63 year old female PMH remote history DVT/PE on xarelto for life, prediabetes, hypothyroidism, PCOS, fibromyalgia, MO who presents with large panniculus with frequent rash/infection desiring skin removal.      PLAN: We had a thorough discussion about the " procedure today. I explained what a panniculectomy consists of and what it does not. I explained the difference between a panniculectomy and an abdominoplasty. I explained the risks to include bleeding, infection, injury to surrounding structures, fluid collection, wound healing difficulties, contour deformity, dog ears, asymmetry, loss of umbilicus, inability to remove all excess tissue, and DVT/PE. she is interested in panniculectomy only. I think the procedure would help with her significant excess skin and frequent rashes. For Kyung we need to make sure she is safe to undergo this procedure, high risk for DVT/PE with her history and wound healing issues with her BMI. We discussed this and she understands.     -Needs maia op anticoagulation plan. When to start/stop xarelto. DVT/PE risk off meds. Bridging needed?   -Will contact us once she has talked to PCP and have a plan in writing in regards to DVT/PE risk and periop anticoagulation. Can enter orders or be seen in clinic with Dr. Kaplan at that time if they would like to meet prior to planning surgery. Prefer she be seen in clinic with Dr. Kaplan prior to scheduling. CT from 2016 at OSH reviewed after she left clinic, small fat containing hernia noted but unable to see images.       65 minutes spent on the date of the encounter doing chart review, history and physical, dressing changes, documentation and further activity as noted above.      Again, thank you for allowing me to participate in the care of your patient.      Sincerely,    Perla Bustillos PA-C

## 2024-03-19 NOTE — NURSING NOTE
03/13/23      Cyndi Kannan  [address] [place] [address]    Dear Cyndi,    We look forward to seeing you on 05/15/2023 arrival 1:30pm for your procedure at 2:30pm    To better prepare you, please observe the following:     Preparing for GI Procedure    ? Please schedule an appointment within 30 days of surgery with your primary care doctor for a history and physical. They will perform any required labs or tests before surgery. If your doctor is not an [facility] physician, please ask them to fax the H&P and test results to [phone].      You will receive a call from our Pre-Admission Testing department prior to your surgical procedure. This call is necessary to get important information about your health history, to ensure you are properly prepared for surgery, and minimize the chance of having your procedure delayed or rescheduled.    WHAT TO DO ABOUT YOUR PRESCRIPTION MEDICATIONS  You must continue taking all your previously prescribed medications as directed unless specifically told not to by your doctor, or if you find them on the list below    STOP THESE MEDICATIONS   • Aspirin stop 3 days prior unless instructed otherwise by your doctor or Cardiologist.   • Erectile dysfunction medications stop 2 days prior    • Herbal medications/ Vitamins/ Fish Oil stop 7 days prior unless otherwise directed by your doctor.    • Phentermine stop 7 days prior   • Selegiline patches stop 21 days prior   Your doctor will have given you instructions about modifying your Insulin regimen 1 day prior.  If on a blood thinner, ask your doctor, Cardiologist and/or surgeon if and when it should be stopped.    You will be contacted by phone or email by a company called [employer]. This is an online educational platform that will provide education regarding your procedure. Please take the time to review this video.    Day before Procedure  Please follow any specific instructions given to you regarding any prep needed  "Chief Complaint   Patient presents with    Consult     Panniculectomy.       Vitals:    03/19/24 0949   BP: (!) 151/74   BP Location: Left arm   Patient Position: Sitting   Cuff Size: Adult Large   Pulse: 78   SpO2: 97%   Weight: 126.7 kg (279 lb 4.8 oz)   Height: 1.626 m (5' 4\")       Body mass index is 47.94 kg/m .    Patient reports moderate back and bilateral knee pain (5/10).    Marcus Perez, EMT    " related to your procedure.        Day of Procedure  Take your normal morning medications with a sip of water first thing in the morning prior to surgery, except those medications your doctor or our Pre-Admission Testing office has specifically told you not to take.    Other than a sip of water to wash down medications, DO NOT PUT ANYTHING IN YOUR MOUTH FOR AT LEAST FOUR HOURS PRIOR TO YOUR PROCEDURE.  This includes gum, candy, cigarettes, and chewing tobacco.       If you choose, for your convenience we have an outpatient pharmacy on-site and can arrange to have your discharge prescription filled before you leave, to save you a trip. You may want to bring enough money to cover the cost of this prescription.      Each patient that comes through the GI department is given individualized care and attention. The doctors and nurses spend the time necessary with each patient to ensure the best care. Sometimes, this can cause delays. You may want to bring a book, puzzle or music player to keep you busy if you experience a delay.       Procedures usually involve giving medications that put you to sleep or ease anxiety. For this reason, you must have a ride home from the hospital and someone (over age 16) should stay with you for the first 24 hours after your procedure. The procedure will be canceled if you do not have a responsible person with you. You are not allowed to drive after receiving sedation of any type.     Visitor Restrictions are currently in place due to COVID-19.  You can bring one designated adult (18 yr or older) with you to your surgery. Additional COVID related restrictions may apply.     Bring a photo ID, your insurance card and any copay due at time of service. Leave all valuables at home. Do not wear jewelry, makeup, body lotion, or fingernail polish.        If you have any questions or concerns before the day of your procedure, please call [phone] to have them answered.     After your procedure  you may get a survey via email or text message. Please take the opportunity to fill it out letting us know what we did well or what we could improve on. Our goal is to provide our patients with the best possible care and we couldn’t do it without your input.     We look forward to seeing you,  Your GI Services Team

## 2024-05-15 NOTE — PROCEDURE: MOHS SURGERY
Went to Central State a history of clots....   Cheiloplasty (Complex) Text: A decision was made to reconstruct the defect with a  cheiloplasty.  The defect was undermined extensively.  Additional obicularis oris muscle was excised with a 15 blade scalpel.  The defect was converted into a full thickness wedge to facilite a better cosmetic result.  Small vessels were then tied off with 5-0 monocyrl. The obicularis oris, superficial fascia, adipose and dermis were then reapproximated.  After the deeper layers were approximated the epidermis was reapproximated with particular care given to realign the vermilion border.

## 2024-05-23 ENCOUNTER — APPOINTMENT (OUTPATIENT)
Dept: URBAN - METROPOLITAN AREA CLINIC 254 | Age: 64
Setting detail: DERMATOLOGY
End: 2024-05-24

## 2024-05-23 VITALS — WEIGHT: 274 LBS | HEIGHT: 64 IN

## 2024-05-23 DIAGNOSIS — Z86.007 PERSONAL HISTORY OF IN-SITU NEOPLASM OF SKIN: ICD-10-CM

## 2024-05-23 DIAGNOSIS — D18.0 HEMANGIOMA: ICD-10-CM

## 2024-05-23 DIAGNOSIS — L81.4 OTHER MELANIN HYPERPIGMENTATION: ICD-10-CM

## 2024-05-23 DIAGNOSIS — Z85.828 PERSONAL HISTORY OF OTHER MALIGNANT NEOPLASM OF SKIN: ICD-10-CM

## 2024-05-23 DIAGNOSIS — L82.0 INFLAMED SEBORRHEIC KERATOSIS: ICD-10-CM

## 2024-05-23 DIAGNOSIS — L20.89 OTHER ATOPIC DERMATITIS: ICD-10-CM

## 2024-05-23 DIAGNOSIS — L57.8 OTHER SKIN CHANGES DUE TO CHRONIC EXPOSURE TO NONIONIZING RADIATION: ICD-10-CM

## 2024-05-23 DIAGNOSIS — Z71.89 OTHER SPECIFIED COUNSELING: ICD-10-CM

## 2024-05-23 DIAGNOSIS — D22 MELANOCYTIC NEVI: ICD-10-CM

## 2024-05-23 DIAGNOSIS — L82.1 OTHER SEBORRHEIC KERATOSIS: ICD-10-CM

## 2024-05-23 PROBLEM — D22.5 MELANOCYTIC NEVI OF TRUNK: Status: ACTIVE | Noted: 2024-05-23

## 2024-05-23 PROBLEM — D18.01 HEMANGIOMA OF SKIN AND SUBCUTANEOUS TISSUE: Status: ACTIVE | Noted: 2024-05-23

## 2024-05-23 PROCEDURE — OTHER LIQUID NITROGEN: OTHER

## 2024-05-23 PROCEDURE — 99213 OFFICE O/P EST LOW 20 MIN: CPT | Mod: 25

## 2024-05-23 PROCEDURE — OTHER ADDITIONAL NOTES: OTHER

## 2024-05-23 PROCEDURE — OTHER MIPS QUALITY: OTHER

## 2024-05-23 PROCEDURE — OTHER COUNSELING: OTHER

## 2024-05-23 PROCEDURE — 17110 DESTRUCT B9 LESION 1-14: CPT

## 2024-05-23 ASSESSMENT — LOCATION ZONE DERM
LOCATION ZONE: TRUNK
LOCATION ZONE: ARM
LOCATION ZONE: LEG
LOCATION ZONE: FEET
LOCATION ZONE: NOSE
LOCATION ZONE: FACE

## 2024-05-23 ASSESSMENT — LOCATION DETAILED DESCRIPTION DERM
LOCATION DETAILED: RIGHT DORSAL FOOT
LOCATION DETAILED: NASAL DORSUM
LOCATION DETAILED: LEFT ANTERIOR DISTAL THIGH
LOCATION DETAILED: RIGHT CENTRAL MALAR CHEEK
LOCATION DETAILED: LEFT ANTERIOR SHOULDER
LOCATION DETAILED: LEFT DORSAL FOOT
LOCATION DETAILED: LEFT MEDIAL UPPER BACK
LOCATION DETAILED: LEFT SUPERIOR MEDIAL UPPER BACK

## 2024-05-23 ASSESSMENT — LOCATION SIMPLE DESCRIPTION DERM
LOCATION SIMPLE: LEFT UPPER BACK
LOCATION SIMPLE: LEFT SHOULDER
LOCATION SIMPLE: NOSE
LOCATION SIMPLE: LEFT THIGH
LOCATION SIMPLE: LEFT FOOT
LOCATION SIMPLE: RIGHT CHEEK
LOCATION SIMPLE: RIGHT FOOT

## 2024-05-23 NOTE — PROCEDURE: COUNSELING
Detail Level: Detailed
Patient Specific Counseling (Will Not Stick From Patient To Patient): - Seborrheic keratoses are benign growths.\\n- Patients get more of them as they age, and these may grow slowly over time.\\n- Some seborrheic keratoses (particularly larger lesions) require multiple treatments.\\n- Return to clinic should any treated growths not be resolved within 4 weeks.
Detail Level: Simple
Detail Level: Generalized

## 2024-05-23 NOTE — PROCEDURE: ADDITIONAL NOTES
Render Risk Assessment In Note?: no
Detail Level: Simple
Additional Notes: - Pt is allergic to Niacinamide, therefore will opt out in not taking the supplements.

## 2024-05-23 NOTE — HPI: FULL BODY SKIN EXAMINATION
How Severe Are Your Spot(S)?: mild
What Type Of Note Output Would You Prefer (Optional)?: Bullet Format
What Is The Reason For Today's Visit?: Full Body Skin Examination
What Is The Reason For Today's Visit? (Being Monitored For X): concerning skin lesions on an annual basis
Additional History: Patient presents with concerning lesion on top of left thigh, under right breast and left shoulder.

## 2024-05-23 NOTE — PROCEDURE: LIQUID NITROGEN
Show Aperture Variable?: Yes
Application Tool (Optional): Liquid Nitrogen Sprayer
Include Z78.9 (Other Specified Conditions Influencing Health Status) As An Associated Diagnosis?: No
Consent: - Consent was obtained and risks were reviewed prior to procedure today. All questions were answered prior to procedure today.\\n- Risks discussed include but are not limited to pain, crusting, scabbing, blistering, scarring, temporary or permanent darker or lighter pigmentary change, recurrence, incomplete resolution, and infection.
Detail Level: Detailed
Spray Paint Text: The liquid nitrogen was applied to the skin utilizing a spray paint frosting technique.
Medical Necessity Clause: This procedure was medically necessary because the lesions that were treated were:
Post-Care Instructions: - Avoid picking at any of the treated lesions.\\n- Blisters should not be popped. However should a blister rupture, cover it with Vaseline ointment or Aquaphor and a bandage until healed.
Medical Necessity Information: It is in your best interest to select a reason for this procedure from the list below. All of these items fulfill various CMS LCD requirements except the new and changing color options.

## 2024-10-31 ENCOUNTER — APPOINTMENT (OUTPATIENT)
Dept: URBAN - METROPOLITAN AREA CLINIC 254 | Age: 64
Setting detail: DERMATOLOGY
End: 2024-10-31

## 2024-10-31 VITALS — WEIGHT: 274 LBS | HEIGHT: 64 IN

## 2024-10-31 DIAGNOSIS — L81.4 OTHER MELANIN HYPERPIGMENTATION: ICD-10-CM

## 2024-10-31 DIAGNOSIS — L738 OTHER SPECIFIED DISEASES OF HAIR AND HAIR FOLLICLES: ICD-10-CM

## 2024-10-31 DIAGNOSIS — L57.0 ACTINIC KERATOSIS: ICD-10-CM

## 2024-10-31 DIAGNOSIS — L82.0 INFLAMED SEBORRHEIC KERATOSIS: ICD-10-CM

## 2024-10-31 DIAGNOSIS — L663 OTHER SPECIFIED DISEASES OF HAIR AND HAIR FOLLICLES: ICD-10-CM

## 2024-10-31 DIAGNOSIS — D18.0 HEMANGIOMA: ICD-10-CM

## 2024-10-31 DIAGNOSIS — T07XXXA INSECT BITE, NONVENOMOUS, OF OTHER, MULTIPLE, AND UNSPECIFIED SITES, WITHOUT MENTION OF INFECTION: ICD-10-CM

## 2024-10-31 DIAGNOSIS — Z71.89 OTHER SPECIFIED COUNSELING: ICD-10-CM

## 2024-10-31 DIAGNOSIS — D22 MELANOCYTIC NEVI: ICD-10-CM

## 2024-10-31 DIAGNOSIS — Z86.007 PERSONAL HISTORY OF IN-SITU NEOPLASM OF SKIN: ICD-10-CM

## 2024-10-31 DIAGNOSIS — Z85.828 PERSONAL HISTORY OF OTHER MALIGNANT NEOPLASM OF SKIN: ICD-10-CM

## 2024-10-31 DIAGNOSIS — L57.8 OTHER SKIN CHANGES DUE TO CHRONIC EXPOSURE TO NONIONIZING RADIATION: ICD-10-CM

## 2024-10-31 PROBLEM — D18.01 HEMANGIOMA OF SKIN AND SUBCUTANEOUS TISSUE: Status: ACTIVE | Noted: 2024-10-31

## 2024-10-31 PROBLEM — S50.861A INSECT BITE (NONVENOMOUS) OF RIGHT FOREARM, INITIAL ENCOUNTER: Status: ACTIVE | Noted: 2024-10-31

## 2024-10-31 PROBLEM — S40.861A INSECT BITE (NONVENOMOUS) OF RIGHT UPPER ARM, INITIAL ENCOUNTER: Status: ACTIVE | Noted: 2024-10-31

## 2024-10-31 PROBLEM — D23.39 OTHER BENIGN NEOPLASM OF SKIN OF OTHER PARTS OF FACE: Status: ACTIVE | Noted: 2024-10-31

## 2024-10-31 PROBLEM — L02.425 FURUNCLE OF RIGHT LOWER LIMB: Status: ACTIVE | Noted: 2024-10-31

## 2024-10-31 PROBLEM — D22.5 MELANOCYTIC NEVI OF TRUNK: Status: ACTIVE | Noted: 2024-10-31

## 2024-10-31 PROCEDURE — 17003 DESTRUCT PREMALG LES 2-14: CPT | Mod: 59

## 2024-10-31 PROCEDURE — OTHER MIPS QUALITY: OTHER

## 2024-10-31 PROCEDURE — 17000 DESTRUCT PREMALG LESION: CPT | Mod: 59

## 2024-10-31 PROCEDURE — 99213 OFFICE O/P EST LOW 20 MIN: CPT | Mod: 25

## 2024-10-31 PROCEDURE — OTHER COUNSELING: OTHER

## 2024-10-31 PROCEDURE — OTHER ADDITIONAL NOTES: OTHER

## 2024-10-31 PROCEDURE — 17110 DESTRUCT B9 LESION 1-14: CPT

## 2024-10-31 PROCEDURE — OTHER LIQUID NITROGEN: OTHER

## 2024-10-31 ASSESSMENT — LOCATION SIMPLE DESCRIPTION DERM
LOCATION SIMPLE: RIGHT FOREARM
LOCATION SIMPLE: NOSE
LOCATION SIMPLE: NECK
LOCATION SIMPLE: RIGHT POSTERIOR UPPER ARM
LOCATION SIMPLE: RIGHT THIGH
LOCATION SIMPLE: LEFT UPPER BACK
LOCATION SIMPLE: RIGHT CHEEK

## 2024-10-31 ASSESSMENT — LOCATION DETAILED DESCRIPTION DERM
LOCATION DETAILED: RIGHT ANTERIOR PROXIMAL THIGH
LOCATION DETAILED: RIGHT PROXIMAL POSTERIOR UPPER ARM
LOCATION DETAILED: LEFT MEDIAL UPPER BACK
LOCATION DETAILED: NASAL DORSUM
LOCATION DETAILED: NASAL SUPRATIP
LOCATION DETAILED: NASAL TIP
LOCATION DETAILED: RIGHT PROXIMAL DORSAL FOREARM
LOCATION DETAILED: LEFT SUPERIOR MEDIAL UPPER BACK
LOCATION DETAILED: RIGHT CENTRAL MALAR CHEEK
LOCATION DETAILED: LEFT CENTRAL LATERAL NECK

## 2024-10-31 ASSESSMENT — LOCATION ZONE DERM
LOCATION ZONE: FACE
LOCATION ZONE: TRUNK
LOCATION ZONE: ARM
LOCATION ZONE: NOSE
LOCATION ZONE: LEG
LOCATION ZONE: NECK

## 2024-10-31 NOTE — HPI: FULL BODY SKIN EXAMINATION
How Severe Are Your Spot(S)?: mild
What Type Of Note Output Would You Prefer (Optional)?: Bullet Format
What Is The Reason For Today's Visit?: Full Body Skin Examination
What Is The Reason For Today's Visit? (Being Monitored For X): concerning skin lesions on an annual basis
Additional History: The patient presents for FBSE with concerning lesion on her left lateral neck, right jaw line, and right side of forehead.

## 2024-10-31 NOTE — PROCEDURE: LIQUID NITROGEN
Show Spray Paint Technique Variable?: Yes
Medical Necessity Clause: This procedure was medically necessary because the lesions that were treated were:
Include Z78.9 (Other Specified Conditions Influencing Health Status) As An Associated Diagnosis?: No
Medical Necessity Information: It is in your best interest to select a reason for this procedure from the list below. All of these items fulfill various CMS LCD requirements except the new and changing color options.
Application Tool (Optional): Liquid Nitrogen Sprayer
Post-Care Instructions: - Avoid picking at any of the treated lesions.\\n- Blisters should not be popped. However should a blister rupture, cover it with Vaseline ointment or Aquaphor and a bandage until healed.
Detail Level: Detailed
Spray Paint Text: The liquid nitrogen was applied to the skin utilizing a spray paint frosting technique.
Consent: - Consent was obtained and risks were reviewed prior to procedure today. All questions were answered prior to procedure today.\\n- Risks discussed include but are not limited to pain, crusting, scabbing, blistering, scarring, temporary or permanent darker or lighter pigmentary change, recurrence, incomplete resolution, and infection.
Consent: - Discussed that these are a result of cumulative sun exposure.\\n- Consent was obtained and risks were reviewed prior to procedure today. All questions were answered prior to procedure today.\\n- Risks discussed include but are not limited to pain, crusting, scabbing, blistering, scarring, temporary or permanent darker or lighter pigmentary change, recurrence, incomplete resolution, and infection.
Duration Of Freeze Thaw-Cycle (Seconds): 0
Post-Care Instructions: - Patient was instructed to avoid picking at any of the treated lesions.

## 2025-01-21 ENCOUNTER — APPOINTMENT (OUTPATIENT)
Dept: URBAN - METROPOLITAN AREA CLINIC 254 | Age: 65
Setting detail: DERMATOLOGY
End: 2025-01-25

## 2025-01-21 VITALS — WEIGHT: 264 LBS | HEIGHT: 64 IN

## 2025-01-21 DIAGNOSIS — L71.8 OTHER ROSACEA: ICD-10-CM

## 2025-01-21 PROCEDURE — OTHER MIPS QUALITY: OTHER

## 2025-01-21 PROCEDURE — OTHER PRESCRIPTION MEDICATION MANAGEMENT: OTHER

## 2025-01-21 PROCEDURE — OTHER COUNSELING: OTHER

## 2025-01-21 PROCEDURE — OTHER PRESCRIPTION: OTHER

## 2025-01-21 PROCEDURE — 99213 OFFICE O/P EST LOW 20 MIN: CPT

## 2025-01-21 RX ORDER — METRONIDAZOLE 7.5 MG/G
0.75% CREAM TOPICAL BID
Qty: 45 | Refills: 3 | Status: ERX | COMMUNITY
Start: 2025-01-21

## 2025-01-21 ASSESSMENT — LOCATION DETAILED DESCRIPTION DERM
LOCATION DETAILED: LEFT MEDIAL BUCCAL CHEEK
LOCATION DETAILED: LEFT INFERIOR MEDIAL MALAR CHEEK
LOCATION DETAILED: NASAL DORSUM

## 2025-01-21 ASSESSMENT — LOCATION SIMPLE DESCRIPTION DERM
LOCATION SIMPLE: LEFT CHEEK
LOCATION SIMPLE: NOSE

## 2025-01-21 ASSESSMENT — LOCATION ZONE DERM
LOCATION ZONE: NOSE
LOCATION ZONE: FACE

## 2025-01-21 NOTE — PROCEDURE: PRESCRIPTION MEDICATION MANAGEMENT
Modify Regimen: D/c Vaseline, use aquaphor instead on the lips: Sample given.
Discontinue Regimen: Advised to not use Hydrocortisone on the face.
Initiate Treatment: Advised to apply metronidazole twice daily until NOV. then we will monroe down to once daily for maintenance
Plan: Given antibiotic cream to r/o rosacea lesions vs. AK vs. SC lesion. Will recheck at [facility] around April.
Detail Level: Zone
Render In Strict Bullet Format?: No

## 2025-03-22 ENCOUNTER — HEALTH MAINTENANCE LETTER (OUTPATIENT)
Age: 65
End: 2025-03-22

## 2025-05-13 ENCOUNTER — APPOINTMENT (OUTPATIENT)
Dept: URBAN - METROPOLITAN AREA CLINIC 254 | Age: 65
Setting detail: DERMATOLOGY
End: 2025-05-15

## 2025-05-13 VITALS — RESPIRATION RATE: 18 BRPM | WEIGHT: 64 LBS | HEIGHT: 72 IN

## 2025-05-13 DIAGNOSIS — Z86.007 PERSONAL HISTORY OF IN-SITU NEOPLASM OF SKIN: ICD-10-CM

## 2025-05-13 DIAGNOSIS — Z71.89 OTHER SPECIFIED COUNSELING: ICD-10-CM

## 2025-05-13 DIAGNOSIS — L81.4 OTHER MELANIN HYPERPIGMENTATION: ICD-10-CM

## 2025-05-13 DIAGNOSIS — Z80.8 FAMILY HISTORY OF MALIGNANT NEOPLASM OF OTHER ORGANS OR SYSTEMS: ICD-10-CM

## 2025-05-13 DIAGNOSIS — D22 MELANOCYTIC NEVI: ICD-10-CM

## 2025-05-13 DIAGNOSIS — L57.8 OTHER SKIN CHANGES DUE TO CHRONIC EXPOSURE TO NONIONIZING RADIATION: ICD-10-CM

## 2025-05-13 DIAGNOSIS — D49.2 NEOPLASM OF UNSPECIFIED BEHAVIOR OF BONE, SOFT TISSUE, AND SKIN: ICD-10-CM

## 2025-05-13 DIAGNOSIS — D18.0 HEMANGIOMA: ICD-10-CM

## 2025-05-13 DIAGNOSIS — Z85.828 PERSONAL HISTORY OF OTHER MALIGNANT NEOPLASM OF SKIN: ICD-10-CM

## 2025-05-13 PROBLEM — D18.01 HEMANGIOMA OF SKIN AND SUBCUTANEOUS TISSUE: Status: ACTIVE | Noted: 2025-05-13

## 2025-05-13 PROBLEM — D22.5 MELANOCYTIC NEVI OF TRUNK: Status: ACTIVE | Noted: 2025-05-13

## 2025-05-13 PROBLEM — D23.39 OTHER BENIGN NEOPLASM OF SKIN OF OTHER PARTS OF FACE: Status: ACTIVE | Noted: 2025-05-13

## 2025-05-13 PROCEDURE — OTHER COUNSELING: OTHER

## 2025-05-13 PROCEDURE — 99213 OFFICE O/P EST LOW 20 MIN: CPT | Mod: 25

## 2025-05-13 PROCEDURE — OTHER MIPS QUALITY: OTHER

## 2025-05-13 PROCEDURE — OTHER ADDITIONAL NOTES: OTHER

## 2025-05-13 PROCEDURE — OTHER BIOPSY BY SHAVE METHOD: OTHER

## 2025-05-13 PROCEDURE — 11102 TANGNTL BX SKIN SINGLE LES: CPT

## 2025-05-13 ASSESSMENT — LOCATION SIMPLE DESCRIPTION DERM
LOCATION SIMPLE: LEFT UPPER BACK
LOCATION SIMPLE: RIGHT CHEEK
LOCATION SIMPLE: NOSE

## 2025-05-13 ASSESSMENT — LOCATION DETAILED DESCRIPTION DERM
LOCATION DETAILED: NASAL DORSUM
LOCATION DETAILED: LEFT MEDIAL UPPER BACK
LOCATION DETAILED: LEFT SUPERIOR MEDIAL UPPER BACK
LOCATION DETAILED: RIGHT CENTRAL MALAR CHEEK

## 2025-05-13 ASSESSMENT — LOCATION ZONE DERM
LOCATION ZONE: TRUNK
LOCATION ZONE: NOSE
LOCATION ZONE: FACE

## 2025-06-30 NOTE — PROCEDURE: BLEOMYCIN
Subjective:      Patient ID: Dali Iqbal is a 64 y.o. female.    Chief Complaint: Pain of the Left Hip (No falls, increased pain)    HPI  The patient presents with a new complaint of left hip pain.  Describes pain in the lateral aspect of the hip denies groin pain.  Does have some intermittent back pain but no radiating pain.  She does intermittently have some ongoing left knee pain.  Right knee pain much improved from her previous injection.  Denies trauma to her hip or her knee.  ROS      Objective:    Ortho Exam       Constitutional:   Patient is alert  and oriented in no acute distress  HEENT:  normocephalic atraumatic; PERRL EOMI  Neck:  Supple without adenopathy  Cardiovascular:  Normal rate and rhythm  Pulmonary:  Normal respiratory effort normal chest wall expansion  Abdominal:  Nonprotuberant nondistended  Musculoskeletal:  Moderate tenderness noted over the greater trochanter adequate hip range of motion without discomfort  No significant low back tenderness.  Adequate motor strength distally no leg length discrepancy.  Normal distal neurologic and vascular examination.  Good active bilateral knee range of motion no effusion or increased warmth or erythema no gross instability  Neurological:  No focal defect; cranial nerves 2-12 grossly intact  Psychiatric/behavioral:  Mood and behavior normal    X-Ray Hip 2 or 3 views Left with Pelvis when performed  Narrative: EXAMINATION:  XR HIP WITH PELVIS WHEN PERFORMED 2 OR 3 VIEWS LEFT    CLINICAL HISTORY:  Pain in left hip    TECHNIQUE:  AP pelvis and two views of the left hip.    COMPARISON:  None    FINDINGS:  No acute fracture or dislocation.  No significant soft tissue swelling.    Femoral head is normally positioned within the native acetabulum.  Mild femoroacetabular degenerative osteoarthrosis with small osteophyte formation.  Joint space is preserved.    Pubic symphysis is intact with mild degenerative change.  SI joints are intact.  Bilateral pubic  rami are intact.  Impression: Mild degenerative osteoarthrosis of the left hip.    Electronically signed by: Guilherme De Los Santos  Date:    06/24/2025  Time:    08:27  X-Ray Knee 3 View Left  EXAMINATION:  XR KNEE 3 VIEW LEFT    CLINICAL HISTORY:  Pain in left knee    TECHNIQUE:  AP, lateral, and Merchant views of the left knee were performed.    COMPARISON:  06/01/2022.    FINDINGS:  Mild chronic tricompartmental degenerative osteoarthrosis with mild joint space narrowing and small osteophyte formation.  No significant suprapatellar effusion.  Suspected chronic small bone island of the lateral tibial plateau.    Electronically signed by: Guilherme De Los Santos  Date:    06/24/2025  Time:    08:26       My Radiographs Findings:    Radiographs of the left hip left knee show some degenerative change no acute osseous abnormalities joint space is fairly well-maintained.  Assessment:       Encounter Diagnoses   Name Primary?    Left hip pain Yes    Trochanteric bursitis of left hip          Plan:       I have discussed medical condition treatment options with her at length.  After a verbal consent and sterile prep I injected her left hip over the point of maximum tenderness.  We have provided some rehab exercises for her in a another prescription for meloxicam.  She states she did not pick or other prescription up.  Follow up with me in 4-6 weeks sooner if any questions or problems        Past Medical History:   Diagnosis Date    Atrial fibrillation     COVID-19, 1/2022 3/8/2022    Hyperlipidemia     Hypertension     MVP (mitral valve prolapse)      Past Surgical History:   Procedure Laterality Date    ABLATION, SVT, ACCESSORY PATHWAY N/A 3/7/2024    Procedure: Ablation, SVT, Accessory Pathway;  Surgeon: Sha Mckeon MD;  Location: [facility];  Service: Cardiology;  Laterality: N/A;  SVT, RFA, Carto, MAC, GP, 3 Prep    BUNIONECTOMY      COLONOSCOPY  2010    COLONOSCOPY Left 02/22/2021    Procedure: COLONOSCOPY;  Surgeon: Mark  BARBI Jacobson MD;  Location: [facility];  Service: General;  Laterality: Left;    TUBAL LIGATION         Current Medications[1]    Review of patient's allergies indicates:  No Known Allergies    Family History   Problem Relation Name Age of Onset    Stroke Mother      Atrial fibrillation Mother      Arthritis Mother      Hypertension Mother      Kidney failure Father      Lymphoma Father      Skin cancer Father      Lung cancer Sister      No Known Problems Brother      Diabetes Sister      No Known Problems Brother      Breast cancer Neg Hx      Ovarian cancer Neg Hx       Social History     Occupational History    Not on file   Tobacco Use    Smoking status: Former     Types: Cigarettes    Smokeless tobacco: Never   Substance and Sexual Activity    Alcohol use: Yes     Comment: Wine daily    Drug use: No    Sexual activity: Not Currently     Partners: Male            [1]   Current Outpatient Medications:     amLODIPine (NORVASC) 10 MG tablet, Take 1 tablet (10 mg total) by mouth every evening. Need OFFICE VISIT before next refill, last seen 6/2024. This will be the LAST Rx if visit is not made., Disp: 90 tablet, Rfl: 1    atorvastatin (LIPITOR) 40 MG tablet, Take 1 tablet (40 mg total) by mouth once daily., Disp: 90 tablet, Rfl: 1    hydroCHLOROthiazide (HYDRODIURIL) 12.5 MG Tab, TAKE 1 TABLET DAILY, Disp: 90 tablet, Rfl: 2    meloxicam (MOBIC) 15 MG tablet, Take 1 tablet (15 mg total) by mouth once daily., Disp: 30 tablet, Rfl: 1    meloxicam (MOBIC) 15 MG tablet, Take 1 tablet (15 mg total) by mouth once daily., Disp: 30 tablet, Rfl: 1    potassium 99 mg Tab, Take 400 mg by mouth once. , Disp: , Rfl:     vitamin E 400 UNIT capsule, Take 400 Units by mouth once daily., Disp: , Rfl:      Anesthesia Type: 1% lidocaine with epinephrine

## (undated) DEVICE — EYE CANN IRR 27GA HYDRODISSECTING 585099

## (undated) DEVICE — EYE KNIFE STILETTO VISITEC 1.1MM ANG 45DEG SIDEPORT 376620

## (undated) DEVICE — EYE SHIELD PLASTIC CLEAR UNIVERSAL K9-6050

## (undated) DEVICE — EYE PACK CUSTOM ANTERIOR 30DEG TIP CENTURION PPK6682-04

## (undated) DEVICE — LINEN TOWEL PACK X5 5464

## (undated) DEVICE — EYE LENS CARTRIDGE FOR PLATINUM IOL INJECTOR 1MTEC30

## (undated) DEVICE — GLOVE PROTEXIS MICRO 7.5  2D73PM75

## (undated) DEVICE — STRAP KNEE/BODY 31143004

## (undated) DEVICE — EYE TIP IRRIGATION & ASPIRATION POLYMER CVD 0.3MM 8065751512

## (undated) DEVICE — EYE CANN IRR 25GA CYSTOTOME 581610

## (undated) DEVICE — EYE KNIFE SLIT XSTAR VISITEC 2.5MM 45DEG BEVEL UP 373725

## (undated) DEVICE — EYE PREP BETADINE 5% SOLUTION 30ML 0065-0411-30

## (undated) DEVICE — POSITIONER ARMBOARD FOAM 1PAIR LF FP-ARMB1

## (undated) DEVICE — PACK CATARACT UMMC

## (undated) RX ORDER — ONDANSETRON 2 MG/ML
INJECTION INTRAMUSCULAR; INTRAVENOUS
Status: DISPENSED
Start: 2022-03-14

## (undated) RX ORDER — OXYTOCIN/0.9 % SODIUM CHLORIDE 30/500 ML
PLASTIC BAG, INJECTION (ML) INTRAVENOUS
Status: DISPENSED
Start: 2022-03-14

## (undated) RX ORDER — PHENYLEPHRINE HYDROCHLORIDE 25 MG/ML
SOLUTION/ DROPS OPHTHALMIC
Status: DISPENSED
Start: 2022-03-14

## (undated) RX ORDER — TROPICAMIDE 5 MG/ML
SOLUTION/ DROPS OPHTHALMIC
Status: DISPENSED
Start: 2022-03-14

## (undated) RX ORDER — KETOROLAC TROMETHAMINE 30 MG/ML
INJECTION, SOLUTION INTRAMUSCULAR; INTRAVENOUS
Status: DISPENSED
Start: 2022-03-14

## (undated) RX ORDER — BUPIVACAINE HYDROCHLORIDE 2.5 MG/ML
INJECTION, SOLUTION EPIDURAL; INFILTRATION; INTRACAUDAL
Status: DISPENSED
Start: 2022-03-14

## (undated) RX ORDER — PHENYLEPHRINE HCL IN 0.9% NACL 50MG/250ML
PLASTIC BAG, INJECTION (ML) INTRAVENOUS
Status: DISPENSED
Start: 2022-03-14

## (undated) RX ORDER — ESMOLOL HYDROCHLORIDE 10 MG/ML
INJECTION INTRAVENOUS
Status: DISPENSED
Start: 2022-03-14

## (undated) RX ORDER — LIDOCAINE HCL/EPINEPHRINE/PF 2%-1:200K
VIAL (ML) INJECTION
Status: DISPENSED
Start: 2022-03-14

## (undated) RX ORDER — PROPOFOL 10 MG/ML
INJECTION, EMULSION INTRAVENOUS
Status: DISPENSED
Start: 2022-03-14

## (undated) RX ORDER — DEXAMETHASONE SODIUM PHOSPHATE 4 MG/ML
INJECTION, SOLUTION INTRA-ARTICULAR; INTRALESIONAL; INTRAMUSCULAR; INTRAVENOUS; SOFT TISSUE
Status: DISPENSED
Start: 2022-03-14

## (undated) RX ORDER — GLYCOPYRROLATE 0.2 MG/ML
INJECTION, SOLUTION INTRAMUSCULAR; INTRAVENOUS
Status: DISPENSED
Start: 2022-03-14

## (undated) RX ORDER — LIDOCAINE HYDROCHLORIDE 20 MG/ML
INJECTION, SOLUTION EPIDURAL; INFILTRATION; INTRACAUDAL; PERINEURAL
Status: DISPENSED
Start: 2022-03-14

## (undated) RX ORDER — NITROGLYCERIN 20 MG/100ML
INJECTION INTRAVENOUS
Status: DISPENSED
Start: 2022-03-14

## (undated) RX ORDER — PROPARACAINE HYDROCHLORIDE 5 MG/ML
SOLUTION/ DROPS OPHTHALMIC
Status: DISPENSED
Start: 2022-03-14

## (undated) RX ORDER — MOXIFLOXACIN 5 MG/ML
SOLUTION/ DROPS OPHTHALMIC
Status: DISPENSED
Start: 2022-03-14